# Patient Record
Sex: FEMALE | Race: WHITE | URBAN - METROPOLITAN AREA
[De-identification: names, ages, dates, MRNs, and addresses within clinical notes are randomized per-mention and may not be internally consistent; named-entity substitution may affect disease eponyms.]

---

## 2021-05-19 ENCOUNTER — HOSPITAL ENCOUNTER (OUTPATIENT)
Dept: LAB | Age: 51
Discharge: HOME | End: 2021-05-19

## 2021-05-19 PROCEDURE — 88305 TISSUE EXAM BY PATHOLOGIST: CPT

## 2021-11-09 PROBLEM — Z80.0 FAMILY HISTORY OF COLON CANCER: Status: ACTIVE | Noted: 2019-03-19

## 2021-11-09 PROBLEM — N94.819 VULVODYNIA: Status: ACTIVE | Noted: 2021-11-09

## 2021-11-09 PROBLEM — N94.9 VAGINAL BURNING: Status: ACTIVE | Noted: 2021-11-09

## 2022-03-18 PROBLEM — N94.819 VULVODYNIA: Status: ACTIVE | Noted: 2021-11-09

## 2022-03-19 PROBLEM — Z80.0 FAMILY HISTORY OF COLON CANCER: Status: ACTIVE | Noted: 2019-03-19

## 2022-03-19 PROBLEM — N94.9 VAGINAL BURNING: Status: ACTIVE | Noted: 2021-11-09

## 2022-03-19 PROBLEM — N94.89 VAGINAL BURNING: Status: ACTIVE | Noted: 2021-11-09

## 2022-06-20 RX ORDER — NORGESTREL-ETHINYL ESTRADIOL 0.3-0.03MG
TABLET ORAL
Qty: 84 TABLET | Refills: 4 | OUTPATIENT
Start: 2022-06-20

## 2022-11-29 NOTE — TELEPHONE ENCOUNTER
Pt called requesting refills on OCP's. She is past due for annual exam. Left the pt a message that x3 packs of OCP's will be called into the pharmacy however she needs to call the office back to schedule her WWE.

## 2022-11-30 RX ORDER — NORGESTREL AND ETHINYL ESTRADIOL 0.3-0.03MG
1 KIT ORAL DAILY
Qty: 3 PACKET | Refills: 0 | Status: SHIPPED | OUTPATIENT
Start: 2022-11-30

## 2022-12-06 ENCOUNTER — TELEPHONE (OUTPATIENT)
Dept: OBGYN CLINIC | Age: 52
End: 2022-12-06

## 2022-12-06 NOTE — TELEPHONE ENCOUNTER
Received fax from [employer] mail service about clarification of OCP directions. Called the patient as she skips placebo pills and starts a new pack of pill to not have a period. All questions were answered. Form was completed by [facility] and faxed back to [employer].

## 2023-01-05 RX ORDER — NORGESTREL-ETHINYL ESTRADIOL 0.3-0.03MG
TABLET ORAL
Qty: 84 TABLET | Refills: 4 | OUTPATIENT
Start: 2023-01-05

## 2023-01-20 ENCOUNTER — OFFICE VISIT (OUTPATIENT)
Dept: OBGYN CLINIC | Age: 53
End: 2023-01-20

## 2023-01-20 VITALS
BODY MASS INDEX: 21.53 KG/M2 | WEIGHT: 137.2 LBS | HEIGHT: 67 IN | DIASTOLIC BLOOD PRESSURE: 82 MMHG | SYSTOLIC BLOOD PRESSURE: 132 MMHG

## 2023-01-20 DIAGNOSIS — R87.610 ASCUS OF CERVIX WITH NEGATIVE HIGH RISK HPV: ICD-10-CM

## 2023-01-20 DIAGNOSIS — Z13.89 SCREENING FOR GENITOURINARY CONDITION: ICD-10-CM

## 2023-01-20 DIAGNOSIS — Z01.419 WELL WOMAN EXAM: Primary | ICD-10-CM

## 2023-01-20 LAB
BILIRUBIN, URINE, POC: NEGATIVE
BLOOD URINE, POC: NORMAL
GLUCOSE URINE, POC: NEGATIVE
KETONES, URINE, POC: NEGATIVE
LEUKOCYTE ESTERASE, URINE, POC: NORMAL
NITRITE, URINE, POC: NEGATIVE
PH, URINE, POC: 5.5 (ref 4.6–8)
PROTEIN,URINE, POC: NEGATIVE
SPECIFIC GRAVITY, URINE, POC: 1.03 (ref 1–1.03)
URINALYSIS CLARITY, POC: CLEAR
URINALYSIS COLOR, POC: YELLOW
UROBILINOGEN, POC: NORMAL

## 2023-01-20 RX ORDER — NORGESTREL AND ETHINYL ESTRADIOL 0.3-0.03MG
1 KIT ORAL DAILY
Qty: 3 PACKET | Refills: 4 | Status: SHIPPED | OUTPATIENT
Start: 2023-01-20

## 2023-01-20 RX ORDER — METHYLDOPA 500 MG
160 TABLET ORAL 2 TIMES DAILY
COMMUNITY

## 2023-01-20 RX ORDER — SERTRALINE HYDROCHLORIDE 100 MG/1
125 TABLET, FILM COATED ORAL DAILY
COMMUNITY

## 2023-01-20 NOTE — PROGRESS NOTES
HPI:  Ms. Carolyn Monterroso is a 45 y.o. female who is here today for a well woman exam. She complains of nothing. Timbo Viraj patients psychiatrist prescribed her Sildenafil1%Testosterone0.5 mg/ML/Arginine 6%papaverine0.1% however patient has not used this and would like to discuss further today. Unable to have orgasm last year. Started BP meds 18 mo. Zoloft March    Allergies   Allergen Reactions    Hydrocodone-Acetaminophen Other (See Comments)    Oxycodone-Acetaminophen Other (See Comments)       Current Outpatient Medications   Medication Sig Dispense Refill    sertraline (ZOLOFT) 100 MG tablet Take 125 mg by mouth daily      ferrous sulfate dried (SLOW RELEASE IRON) 160 (50 Fe) MG TBCR extended release tablet Take 160 mg by mouth 2 times daily      norgestrel-ethinyl estradiol (LOW-OGESTREL) 0.3-30 MG-MCG per tablet Take 1 tablet by mouth daily 3 packet 4    ergocalciferol (ERGOCALCIFEROL) 1.25 MG (33364 UT) capsule Take 50,000 Units by mouth      losartan (COZAAR) 50 MG tablet Take 50 mg by mouth daily       No current facility-administered medications for this visit. Past Medical History:   Diagnosis Date    Depression     OCD (obsessive compulsive disorder)      Past Surgical History:   Procedure Laterality Date     SECTION      COLONOSCOPY      x3    HEENT      oral surgery x2    HEENT      sinus surgery    OTHER SURGICAL HISTORY      laparoscopy     Social History     Socioeconomic History    Marital status:      Spouse name: Not on file    Number of children: Not on file    Years of education: Not on file    Highest education level: Not on file   Occupational History    Not on file   Tobacco Use    Smoking status: Never    Smokeless tobacco: Never   Substance and Sexual Activity    Alcohol use:  Yes     Alcohol/week: 0.0 standard drinks    Drug use: No    Sexual activity: Yes     Partners: Male     Birth control/protection: Pill   Other Topics Concern    Not on file   Social History Narrative    Not on file     Social Determinants of Health     Financial Resource Strain: Not on file   Food Insecurity: Not on file   Transportation Needs: Not on file   Physical Activity: Not on file   Stress: Not on file   Social Connections: Not on file   Intimate Partner Violence: Not on file   Housing Stability: Not on file     Family History   Problem Relation Age of Onset    Uterine Cancer Mother     Hypertension Mother     Stroke Maternal Grandfather     Heart Disease Father     Colon Cancer Father         Date Performed Result   PAP 03/03/2021  Negative. HPV Negative. Mammogram 02/14/2020  BD1 negative Suzanne---pt self referred to [facility]   Colonoscopy 05/19/201  A:  \"DUODENAL BIOPSIES\":  UNREMARKABLE SMALL INTESTINAL MUCOSA. B:  \"GE JUNCTION BIOPSIES\":  CHRONIC ESOPHAGITIS WITH MARKEDLY INCREASED EOSINOPHILS. Dexa NA NA     Review of Systems        /82   Ht 5' 6.5\" (1.689 m)   Wt 137 lb 3.2 oz (62.2 kg)   LMP 01/12/2023 (Approximate)   BMI 21.81 kg/m²      Physical Exam  Constitutional:       Appearance: Normal appearance. HENT:      Head: Normocephalic. Cardiovascular:      Rate and Rhythm: Normal rate and regular rhythm. Heart sounds: Normal heart sounds. Pulmonary:      Effort: Pulmonary effort is normal.      Breath sounds: Normal breath sounds. Comments: Bilateral breast exam shows no skin changes, no retraction, no nipple changes or discharge, no masses, no supraclavicular masses and no axillary masses or nodules    Abdominal:      General: Abdomen is flat. Palpations: Abdomen is soft. There is no mass. Genitourinary:     General: Normal vulva. Comments: Vulva is within normal limits vaginal mucosa and cervix appear normal Pap smear is obtained bimanual exam she does states she has some tenderness at the vestibule no masses otherwise normal bimanual exam.  Musculoskeletal:         General: Normal range of motion.    Skin:     General: Skin is warm and dry.   Neurological:      General: No focal deficit present. Mental Status: She is alert. Psychiatric:         Mood and Affect: Mood normal.         Tests:  Results for orders placed or performed in visit on 01/20/23   AMB POC URINALYSIS DIP STICK MANUAL W/O MICRO   Result Value Ref Range    Color (UA POC) Yellow     Clarity (UA POC) Clear     Glucose, Urine, POC Negative Negative    Bilirubin, Urine, POC Negative Negative    Ketones, Urine, POC Negative Negative    Specific Gravity, Urine, POC 1.030 1.001 - 1.035    Blood (UA POC) Moderate Negative    pH, Urine, POC 5.5 4.6 - 8.0    Protein, Urine, POC Negative Negative    Urobilinogen, POC 0.2 mg/dL     Nitrite, Urine, POC Negative Negative    Leukocyte Esterase, Urine, POC Small Negative          Assessment/Plan  Tu Vogel was seen today for annual exam.    Diagnoses and all orders for this visit:    Well woman exam  -     AMB POC URINALYSIS DIP STICK MANUAL W/O MICRO    Screening for genitourinary condition  -     AMB POC URINALYSIS DIP STICK MANUAL W/O MICRO    ASCUS of cervix with negative high risk HPV  -     PAP IG, HPV Rfx HPV 16/18,45; Future  -     PAP IG, HPV Rfx HPV 16/18,45    Other orders  -     norgestrel-ethinyl estradiol (LOW-OGESTREL) 0.3-30 MG-MCG per tablet; Take 1 tablet by mouth daily     Discussed again at this point she has not done well when trying to stop oral contraceptives in the past and I think it is reasonable to continue her on her current dose. Did discuss possibility of decreasing her to a 20 mcg pill in the next year or 2 and then try to taper down further as she tolerates. Discussed risk benefits alternatives. Discussed her issues with anorgasmia and may be related to medications specifically I believe more likely the Zoloft but also her blood pressure medication could be contributing and it may be a combination. I do think the cream that she was prescribed may be of benefit.   Also get discussed possibility of supplementing ginkgo biloba. Discussed all this in detail and rationale for the plan will follow up yearly or sooner if she has any problems. No follow-up provider specified.

## 2023-08-30 RX ORDER — NORGESTREL-ETHINYL ESTRADIOL 0.3-0.03MG
TABLET ORAL
Qty: 84 TABLET | Refills: 4 | OUTPATIENT
Start: 2023-08-30

## 2023-09-06 ENCOUNTER — TELEPHONE (OUTPATIENT)
Dept: OBGYN CLINIC | Age: 53
End: 2023-09-06

## 2023-09-06 DIAGNOSIS — N94.2 VAGINISMUS: Primary | ICD-10-CM

## 2023-09-06 DIAGNOSIS — F52.31 ANORGASMIA OF FEMALE: ICD-10-CM

## 2023-09-06 NOTE — TELEPHONE ENCOUNTER
Patient called requesting a referral to pelvic floor PT for vaginismus; this was discussed at her visit on 1/20/23 with Dr. Reyes Carroll.      Orders Placed This Encounter   Procedures    [facility] - Physical Therapy, [address] [place] [address]     Referral Priority:   Routine     Referral Type:   Eval and Treat     Referral Reason:   Physical Therapy     Requested Specialty:   Physical Therapist     Number of Visits Requested:   1

## 2023-09-20 ENCOUNTER — HOSPITAL ENCOUNTER (OUTPATIENT)
Dept: PHYSICAL THERAPY | Age: 53
Setting detail: RECURRING SERIES
Discharge: HOME | End: 2023-09-23
Attending: OBSTETRICS & GYNECOLOGY
Payer: COMMERCIAL

## 2023-09-20 DIAGNOSIS — N94.10 DYSPAREUNIA IN FEMALE: ICD-10-CM

## 2023-09-20 DIAGNOSIS — R27.8 OTHER LACK OF COORDINATION: ICD-10-CM

## 2023-09-20 DIAGNOSIS — N94.2 VAGINISMUS: Primary | ICD-10-CM

## 2023-09-20 PROCEDURE — 97530 THERAPEUTIC ACTIVITIES: CPT

## 2023-09-20 PROCEDURE — 97162 PT EVAL MOD COMPLEX 30 MIN: CPT

## 2023-09-20 PROCEDURE — 97110 THERAPEUTIC EXERCISES: CPT

## 2023-09-20 NOTE — THERAPY EVALUATION
course of PFPT in . During this course of care she had relief with massage therapy. 2020- severe low back pain. Yoga helps to resolve symptoms. No imaging of low back. Denies hip pain. Current exercise: Yoga, walks 1 mile/day, and intermittent weight lifting. Pt has medical diagnosis of OCD. Describes experiencing rage. She was referred to psychiatry by her trauma therapist and has since been taking medication (Effexar). She feels this has managed her symptoms well. Urinary: Frequency 5-8 x/day, 0-1 x/night. Negative for stress urinary incontinence, urge urinary incontinence, urinary urgency, urinary frequency, incomplete emptying, urinary hesitancy/intermittency, dysuria, hematuria, nocturia, and nocturnal enuresis. Pt does not use pads for urinary protection; 0 pads per day (PPD). Bowel: Regular     Sexual: Pt is  sexually active with male partners. She has not attempted intercourse for the last 3 months. Positive for dyspareunia. Pain is superficial and deep. History of sexual abuse: No.       OB History: Number of pregnancies: 1 Number of caesarean births : 1  ()    Patient Stated Goal(s):  \"Pain free sexual intercourse\"  Initial:      /10 Post Session:    0  /10  Past Medical History/Comorbidities:   Ms. Shannan Giles  has a past medical history of Depression and OCD (obsessive compulsive disorder). Ms. Shannan Giles  has a past surgical history that includes  section; heent; Colonoscopy; heent; and other surgical history. Social History/Living Environment:   Lives With: Spouse       Prior Level of Function/Work/Activity:   Prior level of function: Independent  Occupation: Other(comment)  Type of Occupation: [employer] part time. 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 Learning:   Does the patient/guardian have any barriers to learning?: No barriers       Fall Risk Scale:    Hardin Total Score: 0  Hradin Fall Risk: Low (0-24)          OBJECTIVE

## 2023-09-20 NOTE — PROGRESS NOTES
Jason Baker  : 1970  Primary: Mitesh Nortonx Sc (Janey BROOKS)  Secondary:  [address] [place]  [address] [facility] [address]  [address] [facility] [address]  Phone: [phone]  Fax: [phone] Plan Frequency: 1-2x/week  No data recorded    >PT Visit Info:  Plan Frequency: 1-2x/week      Visit Count:  1    OUTPATIENT PHYSICAL THERAPY:OP NOTE TYPE: OP Note Type: Treatment Note 2023       Episode  }Appt Desk             Medical/Referring Diagnosis:  Vaginismus [N94.2]  Anorgasmia of female [F52.31]    Treatment Diagnosis:    Vaginismus  Other lack of coordination  Dyspareunia in female  Referring Physician:  Javier Padilla MD MD Orders:  PT Eval and Treat   Date of Onset:  No data recorded   Allergies:   Hydrocodone-acetaminophen and Oxycodone-acetaminophen  Restrictions/Precautions:  No data recordedNo data recorded     Interventions Planned (Treatment may consist of any combination of the following):    Current Treatment Recommendations: Strengthening; ROM; Neuromuscular re-education; Manual; Pain management; Home exercise program; Modalities; Therapeutic activities     >Subjective Comments: Pt experiencing pain during vaginal penetration     >Initial:      10>Post Session:        /10  Medications Last Reviewed:  2023  Updated Objective Findings:  See Evaluation Note from today  Treatment   THERAPEUTIC ACTIVITY: ( 30 minutes): Functional activity education regarding anatomy, pathology and role of pelvic floor muscle (PFM) function in relation to presenting symptoms and role of pelvic floor therapy in conservative treatment. , Patient education regarding role and use of vaginal trainers/dilator in plan of care and as part of HEP. Discussed incorporating vaginal trainers into PT POC and provided guidances on appropriate use.   coordinated pelvic floor and diaphragmatic breathing to improve kinesthetic awareness of pelvic muscle mobility and restore proper motor recruitment patterns

## 2023-09-28 ENCOUNTER — HOSPITAL ENCOUNTER (OUTPATIENT)
Dept: PHYSICAL THERAPY | Age: 53
Setting detail: RECURRING SERIES
End: 2023-09-28
Attending: OBSTETRICS & GYNECOLOGY
Payer: COMMERCIAL
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
## 2023-10-09 ENCOUNTER — HOSPITAL ENCOUNTER (OUTPATIENT)
Dept: PHYSICAL THERAPY | Age: 53
Setting detail: RECURRING SERIES
Discharge: HOME | End: 2023-10-12
Attending: OBSTETRICS & GYNECOLOGY
Payer: COMMERCIAL

## 2023-10-09 PROCEDURE — 97110 THERAPEUTIC EXERCISES: CPT

## 2023-10-09 PROCEDURE — 97140 MANUAL THERAPY 1/> REGIONS: CPT

## 2023-10-09 NOTE — PROGRESS NOTES
Dolores Shah  : 1970  Primary: Evia Monday Gil Sc (Janey JAMAAL)  Secondary:  [address]  [address] [facility] [address]  [address] [facility] [address]  Phone: [phone]  Fax: [phone] Plan Frequency: 1-2x/week  No data recorded    >PT Visit Info:  Plan Frequency: 1-2x/week      Visit Count:  2    OUTPATIENT PHYSICAL THERAPY:OP NOTE TYPE: OP Note Type: Treatment Note 10/9/2023       Episode  }Appt Desk             Medical/Referring Diagnosis:  Vaginismus [N94.2]  Anorgasmia of female [F52.31]    Treatment Diagnosis:    Vaginismus  Other lack of coordination  Dyspareunia in female    Referring Physician:  Gold Ramirez MD MD Orders:  PT Eval and Treat   Date of Onset:  No data recorded   Allergies:   Hydrocodone-acetaminophen and Oxycodone-acetaminophen  Restrictions/Precautions:  No data recordedNo data recorded     Interventions Planned (Treatment may consist of any combination of the following):    Current Treatment Recommendations: Strengthening; ROM; Neuromuscular re-education; Manual; Pain management; Home exercise program; Modalities; Therapeutic activities     >Subjective Comments: Pt experiencing pain during vaginal penetration  10/9/23: Pt practicing DB 3x/day. Pt does feel her PFM relax and let go. Completing DB on her back. No bowel/bladder issues. Denies back pain. She is completing her Yoga exercises daily. >Initial:    0  /10>Post Session:        0/10  Medications Last Reviewed:  10/9/2023  Updated Objective Findings:  Taut adductor musculature at origin through left medial thigh. Palpation: via vaginal canal  Superficial Pelvic Floor Musculature (PFM): Tender? Intermediate PFM Tender? Deep PFM Tender?    Superficial Transverse Perineal [x] Right  [x] Left  []DNT  2/10 Deep Transverse Perineal [x] Right  [x] Left  []DNT  Moderate B Puborectalis [x] Right  [x] Left  []DNT  Mild B   Ischiocavernosus [x] Right  [x] Left  []DNT  Moderate B, 5/10 Compressor

## 2023-10-11 ENCOUNTER — HOSPITAL ENCOUNTER (OUTPATIENT)
Dept: PHYSICAL THERAPY | Age: 53
Setting detail: RECURRING SERIES
Discharge: HOME | End: 2023-10-14
Attending: OBSTETRICS & GYNECOLOGY
Payer: COMMERCIAL

## 2023-10-11 PROCEDURE — 97110 THERAPEUTIC EXERCISES: CPT

## 2023-10-11 PROCEDURE — 97140 MANUAL THERAPY 1/> REGIONS: CPT

## 2023-10-11 NOTE — PROGRESS NOTES
today  Equipment provided today:  None  Recommendations/Intent for next treatment session: Next visit will focus on continue intra-vaginal MT, review self perineal massage, dilators    >Total Treatment Billable Duration:  Treatment minutes 55  Time In: 1000  Time Out: 920 [facility], PT       Charge Capture  }Post Session Pain  PT Visit 25 [facility]   [facility]    Future Appointments   Date Time Provider [address] [place] [address]   10/18/2023  9:00 AM Stevenson Tiwari, PT [facility]   10/25/2023 10:00 AM Stevenson Tiwari PT [facility]   1/24/2024 10:00 AM MD bella Caraballo AMB

## 2023-10-18 ENCOUNTER — HOSPITAL ENCOUNTER (OUTPATIENT)
Dept: PHYSICAL THERAPY | Age: 53
Setting detail: RECURRING SERIES
End: 2023-10-18
Attending: OBSTETRICS & GYNECOLOGY
Payer: COMMERCIAL

## 2023-10-18 NOTE — PROGRESS NOTES
Master Jeffries  : 1970  Primary: Jocy Michael Gil Sc  Secondary:   [address] [place] [address] [facility]  [address] [place] [address]  Phone: [phone]  Fax: [phone]    PT Visit Info:    No data recorded   OT Visit Info:  No data recorded    OUTPATIENT THERAPY: 10/18/2023  Episode  Appt Desk        Master Jeffries cancelled her appointment for today due to illness. Pt reported bout of nausea. She has been out of town and not consistent with all of her exercises because of this. Will plan to follow up next during next appointment.   Thank you,  Ibeth Collier, PT    Future Appointments   Date Time Provider [address]   10/18/2023  9:00 AM Ibeth Collier, [facility]   10/25/2023 10:00 [facility]   2024 10:00 AM MD bella Hernandez AMB

## 2023-10-25 ENCOUNTER — HOSPITAL ENCOUNTER (OUTPATIENT)
Dept: PHYSICAL THERAPY | Age: 53
Setting detail: RECURRING SERIES
Discharge: HOME | End: 2023-10-28
Attending: OBSTETRICS & GYNECOLOGY
Payer: COMMERCIAL

## 2023-10-25 PROCEDURE — 97530 THERAPEUTIC ACTIVITIES: CPT

## 2023-10-25 PROCEDURE — 97140 MANUAL THERAPY 1/> REGIONS: CPT

## 2023-10-25 NOTE — PROGRESS NOTES
Rich Ramos  : 1970  Primary: Jairo Cordero Sc (Janey JAMAAL)  Secondary:  [address] [place]  [address] [facility] [address]  [address] [place] [address]  Phone: [phone]  Fax: [phone] Plan Frequency: 1-2x/week  No data recorded    >PT Visit Info:  Plan Frequency: 1-2x/week      Visit Count:  4    OUTPATIENT PHYSICAL THERAPY:OP NOTE TYPE: OP Note Type: Treatment Note 10/25/2023       Episode  }Appt Desk             Medical/Referring Diagnosis:  Vaginismus [N94.2]  Anorgasmia of female [F52.31]    Treatment Diagnosis:    Vaginismus  Other lack of coordination  Dyspareunia in female    Referring Physician:  Roberta Meeks MD MD Orders:  PT Eval and Treat   Date of Onset:  No data recorded   Allergies:   Hydrocodone-acetaminophen and Oxycodone-acetaminophen  Restrictions/Precautions:  No data recordedNo data recorded     Interventions Planned (Treatment may consist of any combination of the following):    Current Treatment Recommendations: Strengthening; ROM; Neuromuscular re-education; Manual; Pain management; Home exercise program; Modalities; Therapeutic activities     >Subjective Comments: Pt experiencing pain during vaginal penetration  10/25/23:  Pt completed self digital PFM massage. \"A little bit of pain after, but this went away quickly\"  Pain rated as 3/10. No current back pain. Pt continues to participate in daily yoga practice with \"[employer]\". This is online. She typically completes yoga in the morning and afternoon. Pt describes no difficulty inserting a tampon. However, the position of the string as very painful. Pt would like to better understand her prognosis. Previous dilator training - she has one dilator, not a set. She did not find this to be helpful. No pain with this dilator. 10/11/23:  Pt denies pelvic pain following her previous session. Pt denies abdominal discomfort.    Pt reports RELIEF with previous physical therapy which included swiping Statement Selected

## 2023-10-30 RX ORDER — NORGESTREL-ETHINYL ESTRADIOL 0.3-0.03MG
TABLET ORAL
Qty: 84 TABLET | Refills: 4 | OUTPATIENT
Start: 2023-10-30

## 2023-11-08 ENCOUNTER — HOSPITAL ENCOUNTER (OUTPATIENT)
Dept: PHYSICAL THERAPY | Age: 53
Setting detail: RECURRING SERIES
Discharge: HOME | End: 2023-11-11
Attending: OBSTETRICS & GYNECOLOGY
Payer: COMMERCIAL

## 2023-11-08 PROCEDURE — 97140 MANUAL THERAPY 1/> REGIONS: CPT

## 2023-11-08 PROCEDURE — 97530 THERAPEUTIC ACTIVITIES: CPT

## 2023-11-08 NOTE — PROGRESS NOTES
Stewart Reeves  : 1970  Primary: Florina Cordero Sc (Janey [facility])  Secondary:  [address] [place]  [address] [facility] [address]  [address] [facility] [address]  Phone: [phone]  Fax: [phone] Plan Frequency: 1-2x/week  No data recorded    >PT Visit Info:  Plan Frequency: 1-2x/week      Visit Count:  5    OUTPATIENT PHYSICAL THERAPY:OP NOTE TYPE: OP Note Type: Treatment Note 2023       Episode  }Appt Desk             Medical/Referring Diagnosis:  Vaginismus [N94.2]  Anorgasmia of female [F52.31]    Treatment Diagnosis:    Vaginismus  Other lack of coordination  Dyspareunia in female    Referring Physician:  Raymundo Beltre MD MD Orders:  PT Eval and Treat   Date of Onset:  No data recorded   Allergies:   Hydrocodone-acetaminophen and Oxycodone-acetaminophen  Restrictions/Precautions:  No data recordedNo data recorded     Interventions Planned (Treatment may consist of any combination of the following):    Current Treatment Recommendations: Strengthening; ROM; Neuromuscular re-education; Manual; Pain management; Home exercise program; Modalities; Therapeutic activities     >Subjective Comments: Pt experiencing pain during vaginal penetration  23:  Pt has discomfort with self digital massage rated as 3/10. Pt denies burning or aching. Possibly ripping discomfort? Discomfort lasts a few minutes (<15 minutes) following and then resolves. Pt reports reduced pain at 6 o'clock. Increased discomfort on the sides. Pain remains the same throughout the process. She brought a vaginal dilator to today's session. She has not used this for one year. It is hard and plastic. She used this particular dilator for 4-5 months. 10/25/23:  Pt completed self digital PFM massage. \"A little bit of pain after, but this went away quickly\"  Pain rated as 3/10. No current back pain. Pt continues to participate in daily yoga practice with \"[employer]\". This is online.    She typically completes yoga in

## 2023-11-15 ENCOUNTER — HOSPITAL ENCOUNTER (OUTPATIENT)
Dept: PHYSICAL THERAPY | Age: 53
Setting detail: RECURRING SERIES
Discharge: HOME | End: 2023-11-18
Attending: OBSTETRICS & GYNECOLOGY
Payer: COMMERCIAL

## 2023-11-15 PROCEDURE — 97530 THERAPEUTIC ACTIVITIES: CPT

## 2023-11-15 PROCEDURE — 97140 MANUAL THERAPY 1/> REGIONS: CPT

## 2023-11-15 NOTE — PROGRESS NOTES
Sunil Patricia  : 1970  Primary: Cortez Cordero Sc (Janey [facility])  Secondary:  [address] [place] [address]  [address] [facility] [address]  Phone: [phone]  Fax: [phone] Plan Frequency: 1-2x/week  No data recorded    >PT Visit Info:  Plan Frequency: 1-2x/week      Visit Count:  6    OUTPATIENT PHYSICAL THERAPY:OP NOTE TYPE: OP Note Type: Treatment Note 11/15/2023       Episode  }Appt Desk             Medical/Referring Diagnosis:  Vaginismus [N94.2]  Anorgasmia of female [F52.31]    Treatment Diagnosis:    Vaginismus  Other lack of coordination  Dyspareunia in female    Referring Physician:  Jaleesa Roberts MD MD Orders:  PT Eval and Treat   Date of Onset:  No data recorded   Allergies:   Hydrocodone-acetaminophen and Oxycodone-acetaminophen  Restrictions/Precautions:  No data recordedNo data recorded     Interventions Planned (Treatment may consist of any combination of the following):    Current Treatment Recommendations: Strengthening; ROM; Neuromuscular re-education; Manual; Pain management; Home exercise program; Modalities; Therapeutic activities     >Subjective Comments: Pt experiencing pain during vaginal penetration  11/15/23:  Pt used size 5 and 6 (IR) dilators. Ease of insertion with size 6 - no problem. Pain rated as 2/10. Duration: 2 minutes. Ease of insertion with size 5 - 10 minutes. Pain 1/10. Pain present on right vaginal wall (superficial). Pain stated the same throughout the process of using dilators. NO discomfort following dilator use    23:  Pt has discomfort with self digital massage rated as 3/10. Pt denies burning or aching. Possibly ripping discomfort? Discomfort lasts a few minutes (<15 minutes) following and then resolves. Pt reports reduced pain at 6 o'clock. Increased discomfort on the sides. Pain remains the same throughout the process. She brought a vaginal dilator to today's session. She has not used this for one year.  It

## 2023-11-21 ENCOUNTER — HOSPITAL ENCOUNTER (OUTPATIENT)
Dept: PHYSICAL THERAPY | Age: 53
Setting detail: RECURRING SERIES
Discharge: HOME | End: 2023-11-24
Attending: OBSTETRICS & GYNECOLOGY
Payer: COMMERCIAL

## 2023-11-21 PROCEDURE — 97140 MANUAL THERAPY 1/> REGIONS: CPT

## 2023-11-21 PROCEDURE — 97530 THERAPEUTIC ACTIVITIES: CPT

## 2023-11-21 NOTE — PROGRESS NOTES
Rich Ramos  : 1970  Primary: Jairo Tian Gil Sc (Janey HINTON)  Secondary:  [address] @ [place] [address]  [address] [facility] [address]  Phone: [phone]  Fax: [phone] Plan Frequency: 1-2x/week  No data recorded    >PT Visit Info:  Plan Frequency: 1-2x/week      Visit Count:  7    OUTPATIENT PHYSICAL THERAPY:OP NOTE TYPE: OP Note Type: Treatment Note 2023       Episode  }Appt Desk             Medical/Referring Diagnosis:  Vaginismus [N94.2]  Anorgasmia of female [F52.31]    Treatment Diagnosis:    Vaginismus  Other lack of coordination  Dyspareunia in female    Referring Physician:  Roberta Meeks MD MD Orders:  PT Eval and Treat   Date of Onset:  No data recorded   Allergies:   Hydrocodone-acetaminophen and Oxycodone-acetaminophen  Restrictions/Precautions:  No data recordedNo data recorded     Interventions Planned (Treatment may consist of any combination of the following):    Current Treatment Recommendations: Strengthening; ROM; Neuromuscular re-education; Manual; Pain management; Home exercise program; Modalities; Therapeutic activities     >Subjective Comments: Pt experiencing pain during vaginal penetration  23:  Dilator #5 was very easy. She used this for 4 minutes. Dilator 6 rated as 2/10. Pt used this for approximately 7-8 minutes. States this was not bad.     11/15/23:  Pt used size 5 and 6 (IR) dilators. Ease of insertion with size 6 - no problem. Pain rated as 2/10. Duration: 2 minutes. Ease of insertion with size 5 - 10 minutes. Pain 1/10. Pain present on right vaginal wall (superficial). Pain stated the same throughout the process of using dilators. NO discomfort following dilator use    23:  Pt has discomfort with self digital massage rated as 3/10. Pt denies burning or aching. Possibly ripping discomfort? Discomfort lasts a few minutes (<15 minutes) following and then resolves. Pt reports reduced pain at 6 o'clock.  Increased

## 2023-11-29 ENCOUNTER — HOSPITAL ENCOUNTER (OUTPATIENT)
Dept: PHYSICAL THERAPY | Age: 53
Setting detail: RECURRING SERIES
Discharge: HOME | End: 2023-12-02
Attending: OBSTETRICS & GYNECOLOGY
Payer: COMMERCIAL

## 2023-11-29 PROCEDURE — 97530 THERAPEUTIC ACTIVITIES: CPT

## 2023-11-29 PROCEDURE — 97140 MANUAL THERAPY 1/> REGIONS: CPT

## 2023-11-29 NOTE — PROGRESS NOTES
proximal adductors, B     Use of dilator size #6 *no muscular resistance felt                           (Used abbreviations: MET - muscle energy technique; SCS- Strain counter strain; CTM-Connective tissue mobilizations; CR- Contract/Relax; SP- Sustained pressure; PIT- Positional inhibition techniques; STM Soft -tissue mobilization; MM- Myofascial mobilization; TrP-Trigger point release; IASTM- Instrument assisted soft tissue mobilizations, TDN-Trigger point dry needling)    Pt gives verbal consent to internal vaginal assessment/treatment without chaperon present. Treatment/Session Summary:    >Treatment Assessment:  Pt continues to progress well in PFPT. Pt rated pain/discomfort as 1/10 with palpation of PFM at its highest. No increased muscular resistance with insertion, sustained pressure, or removal of vaginal dilator. Communication/Consultation:  None today  Equipment provided today:  None  Recommendations/Intent for next treatment session: Next visit will focus on continue intra-vaginal MT, progress with dilator training.  Pt to continue dilator 6 and progress to dilator 7.    >Total Treatment Billable Duration:  Treatment minutes 55  Time In: 0205  Time Out: 0302    [facility], PT       Charge Capture  }Post Session Pain  PT Visit Info  [address] [facility] Portal  MD Guidelines  Scanned Media  Benefits  MyChart    Future Appointments   Date Time Provider [address]   12/8/2023 11:00 AM [facility], PT [facility]   12/15/2023 11:00 AM [facility] PT [facility]   12/18/2023 10:00 AM [facility], PT [facility]   12/28/2023 11:00 AM [facility], PT [facility]   1/24/2024 10:00 AM MD bella Garcia AMB

## 2023-12-08 ENCOUNTER — HOSPITAL ENCOUNTER (OUTPATIENT)
Dept: PHYSICAL THERAPY | Age: 53
Setting detail: RECURRING SERIES
Discharge: HOME | End: 2023-12-11
Attending: OBSTETRICS & GYNECOLOGY
Payer: COMMERCIAL

## 2023-12-08 PROCEDURE — 97530 THERAPEUTIC ACTIVITIES: CPT

## 2023-12-08 PROCEDURE — 97140 MANUAL THERAPY 1/> REGIONS: CPT

## 2023-12-08 NOTE — PROGRESS NOTES
Jameson Duet  : 1970  Primary: Danisha Cordero Sc ([facility])  Secondary:  [address] @ [place]  [address] [facility] [address]  [address] [facility] [address]  Phone: [phone]  Fax: [phone] Plan Frequency: 1-2x/week  No data recorded    >PT Visit Info:  Plan Frequency: 1-2x/week      Visit Count:  9    OUTPATIENT PHYSICAL THERAPY:OP NOTE TYPE: OP Note Type: Treatment Note 2023       Episode  }Appt Desk             Medical/Referring Diagnosis:  Vaginismus [N94.2]  Anorgasmia of female [F52.31]    Treatment Diagnosis:    Vaginismus  Other lack of coordination  Dyspareunia in female    Referring Physician:  Moshe Ordonez MD MD Orders:  PT Eval and Treat   Date of Onset:  No data recorded   Allergies:   Hydrocodone-acetaminophen and Oxycodone-acetaminophen  Restrictions/Precautions:  No data recordedNo data recorded     Interventions Planned (Treatment may consist of any combination of the following):    Current Treatment Recommendations: Strengthening; ROM; Neuromuscular re-education; Manual; Pain management; Home exercise program; Modalities; Therapeutic activities     >Subjective Comments: Pt experiencing pain during vaginal penetration  23:  Pt is using dilator #6 without pain. She has tried dilator size #7 and reports pain 5/10.    23:  Pt states dilators #5 and #6 are non painful. She is able to use these without difficulty. Pt has dilator #7 at home. She has not tried this. 23:  Dilator #5 was very easy. She used this for 4 minutes. Dilator 6 rated as 2/10. Pt used this for approximately 7-8 minutes. States this was not bad.     11/15/23:  Pt used size 5 and 6 (IR) dilators. Ease of insertion with size 6 - no problem. Pain rated as 2/10. Duration: 2 minutes. Ease of insertion with size 5 - 10 minutes. Pain 1/10. Pain present on right vaginal wall (superficial). Pain stated the same throughout the process of using dilators.    NO discomfort following

## 2023-12-28 ENCOUNTER — HOSPITAL ENCOUNTER (OUTPATIENT)
Dept: PHYSICAL THERAPY | Age: 53
Setting detail: RECURRING SERIES
Discharge: HOME | End: 2023-12-31
Attending: OBSTETRICS & GYNECOLOGY
Payer: COMMERCIAL

## 2023-12-28 PROCEDURE — 97110 THERAPEUTIC EXERCISES: CPT

## 2023-12-28 PROCEDURE — 97140 MANUAL THERAPY 1/> REGIONS: CPT

## 2023-12-28 NOTE — PROGRESS NOTES
Jyotsna Lawler  : 1970  Primary: Janey Hinton Gil Sc (Janey HINTON)  Secondary:  [facility] @ [address]  [place] [address]  Phone: [phone]  Fax: [phone] Plan Frequency: 1-2x/week  No data recorded    >PT Visit Info:  Plan Frequency: 1-2x/week      Visit Count:  12    OUTPATIENT PHYSICAL THERAPY:OP NOTE TYPE: OP Note Type: Treatment Note 2023       Episode  }Appt Desk             Medical/Referring Diagnosis:  Vaginismus [N94.2]  Anorgasmia of female [F52.31]    Treatment Diagnosis:    Vaginismus  Other lack of coordination  Dyspareunia in female    Referring Physician:  Fred Delong MD MD Orders:  PT Eval and Treat   Date of Onset:  No data recorded   Allergies:   Hydrocodone-acetaminophen and Oxycodone-acetaminophen  Restrictions/Precautions:  No data recordedNo data recorded     Interventions Planned (Treatment may consist of any combination of the following):    Current Treatment Recommendations: Strengthening; ROM; Neuromuscular re-education; Manual; Pain management; Home exercise program; Modalities; Therapeutic activities     >Subjective Comments: Pt experiencing pain during vaginal penetration  23:  Pt took a break from dilators for a few days over the Holidays. She tried using them yesterday and this went better than she expected.   Some pain continues with size 7 dilator. She continues to start with size 6 and transitions to size 7. Pain occurs during and with removal of dilator.   Pain feels SUPERFICIAL. Discomfort described as sharp. No perineal pain during day or after use.     23:  No pain following previous session.   Discomfort graded as 3/10 with use of size 7 dilator. Size 6 dilator rated as \"easy\".   She is using the dilators daily.     12/15/23:  See Re-certfication    23:  Pt is using dilator #6 without pain. She has tried dilator size #7 and reports pain 5/10.    >Initial:    0  /10>Post Session:

## 2024-01-03 ENCOUNTER — HOSPITAL ENCOUNTER (OUTPATIENT)
Dept: PHYSICAL THERAPY | Age: 54
Setting detail: RECURRING SERIES
Discharge: HOME | End: 2024-01-06
Attending: OBSTETRICS & GYNECOLOGY
Payer: COMMERCIAL

## 2024-01-03 PROCEDURE — 97140 MANUAL THERAPY 1/> REGIONS: CPT

## 2024-01-03 PROCEDURE — 97530 THERAPEUTIC ACTIVITIES: CPT

## 2024-01-10 ENCOUNTER — APPOINTMENT (OUTPATIENT)
Dept: PHYSICAL THERAPY | Age: 54
End: 2024-01-10
Attending: OBSTETRICS & GYNECOLOGY
Payer: COMMERCIAL

## 2024-01-17 ENCOUNTER — HOSPITAL ENCOUNTER (OUTPATIENT)
Dept: PHYSICAL THERAPY | Age: 54
Setting detail: RECURRING SERIES
Discharge: HOME | End: 2024-01-20
Attending: OBSTETRICS & GYNECOLOGY
Payer: COMMERCIAL

## 2024-01-17 PROCEDURE — 97530 THERAPEUTIC ACTIVITIES: CPT

## 2024-01-17 PROCEDURE — 97140 MANUAL THERAPY 1/> REGIONS: CPT

## 2024-01-23 NOTE — PROGRESS NOTES
HPI:  Ms. Lawler is a 53 y.o. female who is here today for a well woman exam. She complains of nothing.   Wants to discuss if she need to continue taking the OCP's.     Having issues with trying to have stopped in the past and is very concerned about stopping at this point especially as she is working with physical therapy on pelvic floor issues and this seems to be doing better    Allergies   Allergen Reactions    Oxycodone-Acetaminophen Other (See Comments)       Current Outpatient Medications   Medication Sig Dispense Refill    venlafaxine (EFFEXOR XR) 37.5 MG extended release capsule Take 1 capsule by mouth every morning      DUPIXENT 300 MG/2ML SOPN injection Inject 2 mLs into the skin every 14 days      norgestrel-ethinyl estradiol (LOW-OGESTREL) 0.3-30 MG-MCG per tablet Take 1 tablet by mouth daily Active pills only 3 packet 4    ferrous sulfate dried (SLOW RELEASE IRON) 160 (50 Fe) MG TBCR extended release tablet Take 160 mg by mouth 2 times daily      ergocalciferol (ERGOCALCIFEROL) 1.25 MG (57279 UT) capsule Take 1 capsule by mouth      losartan (COZAAR) 50 MG tablet Take 1 tablet by mouth daily       No current facility-administered medications for this visit.       Past Medical History:   Diagnosis Date    Depression     OCD (obsessive compulsive disorder)      Past Surgical History:   Procedure Laterality Date     SECTION      COLONOSCOPY      x3    HEENT      oral surgery x2    HEENT      sinus surgery    OTHER SURGICAL HISTORY      laparoscopy     Social History     Socioeconomic History    Marital status:      Spouse name: Not on file    Number of children: Not on file    Years of education: Not on file    Highest education level: Not on file   Occupational History    Not on file   Tobacco Use    Smoking status: Never    Smokeless tobacco: Never   Substance and Sexual Activity    Alcohol use: Yes     Alcohol/week: 0.0 standard drinks of alcohol    Drug use: No    Sexual activity: Yes

## 2024-01-24 ENCOUNTER — OFFICE VISIT (OUTPATIENT)
Dept: OBGYN CLINIC | Age: 54
End: 2024-01-24
Payer: COMMERCIAL

## 2024-01-24 VITALS
WEIGHT: 145 LBS | BODY MASS INDEX: 22.76 KG/M2 | DIASTOLIC BLOOD PRESSURE: 80 MMHG | SYSTOLIC BLOOD PRESSURE: 122 MMHG | HEIGHT: 67 IN

## 2024-01-24 DIAGNOSIS — Z01.419 WELL WOMAN EXAM: Primary | ICD-10-CM

## 2024-01-24 DIAGNOSIS — Z12.31 ENCOUNTER FOR SCREENING MAMMOGRAM FOR BREAST CANCER: ICD-10-CM

## 2024-01-24 DIAGNOSIS — Z13.89 SCREENING FOR GENITOURINARY CONDITION: ICD-10-CM

## 2024-01-24 LAB
BILIRUBIN, URINE, POC: NEGATIVE
BLOOD URINE, POC: NORMAL
GLUCOSE URINE, POC: NEGATIVE
KETONES, URINE, POC: NEGATIVE
LEUKOCYTE ESTERASE, URINE, POC: NORMAL
NITRITE, URINE, POC: NEGATIVE
PH, URINE, POC: 6 (ref 4.6–8)
PROTEIN,URINE, POC: NEGATIVE
SPECIFIC GRAVITY, URINE, POC: 1.02 (ref 1–1.03)
URINALYSIS CLARITY, POC: CLEAR
URINALYSIS COLOR, POC: YELLOW
UROBILINOGEN, POC: NORMAL

## 2024-01-24 PROCEDURE — 81002 URINALYSIS NONAUTO W/O SCOPE: CPT | Performed by: OBSTETRICS & GYNECOLOGY

## 2024-01-24 PROCEDURE — 99396 PREV VISIT EST AGE 40-64: CPT | Performed by: OBSTETRICS & GYNECOLOGY

## 2024-01-24 RX ORDER — CEPHALEXIN 500 MG/1
500 CAPSULE ORAL 2 TIMES DAILY
COMMUNITY
Start: 2024-01-22 | End: 2024-01-24

## 2024-01-24 RX ORDER — DUPILUMAB 300 MG/2ML
300 INJECTION, SOLUTION SUBCUTANEOUS
COMMUNITY
Start: 2024-01-09

## 2024-01-24 RX ORDER — VENLAFAXINE HYDROCHLORIDE 37.5 MG/1
37.5 CAPSULE, EXTENDED RELEASE ORAL EVERY MORNING
COMMUNITY
Start: 2023-11-29

## 2024-01-24 RX ORDER — NORGESTREL AND ETHINYL ESTRADIOL 0.3-0.03MG
1 KIT ORAL DAILY
Qty: 3 PACKET | Refills: 4 | Status: SHIPPED | OUTPATIENT
Start: 2024-01-24

## 2024-01-26 ENCOUNTER — HOSPITAL ENCOUNTER (OUTPATIENT)
Dept: PHYSICAL THERAPY | Age: 54
Setting detail: RECURRING SERIES
Discharge: HOME | End: 2024-01-29
Attending: OBSTETRICS & GYNECOLOGY
Payer: COMMERCIAL

## 2024-01-26 PROCEDURE — 97140 MANUAL THERAPY 1/> REGIONS: CPT

## 2024-01-26 PROCEDURE — 97530 THERAPEUTIC ACTIVITIES: CPT

## 2024-01-26 ASSESSMENT — PAIN DESCRIPTION - LOCATION: LOCATION: HIP

## 2024-01-26 ASSESSMENT — PAIN SCALES - GENERAL: PAINLEVEL_OUTOF10: 2

## 2024-01-26 NOTE — PROGRESS NOTES
Jyotsna Lawler  : 1970  Primary: Janey Hinton Gil Sc (Janey HINTON)  Secondary:  [facility] @ [address]  [place] [address]  Phone: [phone]  Fax: [phone] Plan Frequency: 1-2x/week  No data recorded    >PT Visit Info:  Plan Frequency: 1-2x/week      Visit Count:  15    OUTPATIENT PHYSICAL THERAPY:OP NOTE TYPE: OP Note Type: Treatment Note 2024       Episode  }Appt Desk             Medical/Referring Diagnosis:  Vaginismus [N94.2]  Anorgasmia of female [F52.31]    Treatment Diagnosis:    Vaginismus  Other lack of coordination  Dyspareunia in female    Referring Physician:  Fred Delong MD MD Orders:  PT Eval and Treat   Date of Onset:  No data recorded   Allergies:   Oxycodone-acetaminophen  Restrictions/Precautions:  No data recordedNo data recorded     Interventions Planned (Treatment may consist of any combination of the following):    Current Treatment Recommendations: Strengthening; ROM; Neuromuscular re-education; Manual; Pain management; Home exercise program; Modalities; Therapeutic activities     >Subjective Comments: Pt experiencing pain during vaginal penetration  24:  Size 7 vaginal dilator described as painless at this point. Size 8 vaginal remains challenging. Pt completing 3 minutes with size 8 . Prior to pain onset increasing.   Pt without intense lingering pain following dilator use.   Pt finishing up antibiotic for UTI. Pt without pain from UTI. States she noticed onset of bleeding that was strange.  Pt describes left hip pain. Not radiating. Pain located in lateral and proximal gluteal area. Pain eases with rest.     24:  Pt continues to report reduction in pain and improvement in ease of size #7. Size 6 vaginal dilator easy to use.   Pt has goal to get to size #8 vaginal dilator.   Mild left posterior-buttock pain.  Pt continues to do yoga daily. She did yoga this morning.     1/3/23:  Pt ordered Releveum balm.

## 2024-02-02 ENCOUNTER — HOSPITAL ENCOUNTER (OUTPATIENT)
Dept: PHYSICAL THERAPY | Age: 54
Setting detail: RECURRING SERIES
Discharge: HOME | End: 2024-02-05
Attending: OBSTETRICS & GYNECOLOGY
Payer: COMMERCIAL

## 2024-02-02 PROCEDURE — 97530 THERAPEUTIC ACTIVITIES: CPT

## 2024-02-02 PROCEDURE — 97140 MANUAL THERAPY 1/> REGIONS: CPT

## 2024-02-02 ASSESSMENT — PAIN SCALES - GENERAL: PAINLEVEL_OUTOF10: 0

## 2024-02-02 NOTE — PROGRESS NOTES
Jyotsna Lawler  : 1970  Primary: Janey Hinton Gil Sc (Janey HINTON)  Secondary:  [facility] @ [address]  [place] [address]  Phone: [phone]  Fax: [phone] Plan Frequency: 1-2x/week  No data recorded    >PT Visit Info:  Plan Frequency: 1-2x/week      Visit Count:  16    OUTPATIENT PHYSICAL THERAPY:OP NOTE TYPE: OP Note Type: Treatment Note 2024       Episode  }Appt Desk             Medical/Referring Diagnosis:  Vaginismus [N94.2]  Anorgasmia of female [F52.31]    Treatment Diagnosis:    Vaginismus  Other lack of coordination  Dyspareunia in female    Referring Physician:  Fred Delong MD MD Orders:  PT Eval and Treat   Date of Onset:  No data recorded   Allergies:   Oxycodone-acetaminophen  Restrictions/Precautions:  No data recordedNo data recorded     Interventions Planned (Treatment may consist of any combination of the following):    Current Treatment Recommendations: Strengthening; ROM; Neuromuscular re-education; Manual; Pain management; Home exercise program; Modalities; Therapeutic activities     >Subjective Comments: Pt experiencing pain during vaginal penetration  24: \"Dilators are going well. Size 8 is a 3-4/10. I am able to keep it in for 5 minutes\"  Discomfort with removal of size 8 dilator described as pain. Denies burning.     24:  Size 7 vaginal dilator described as painless at this point. Size 8 vaginal remains challenging. Pt completing 3 minutes with size 8 . Prior to pain onset increasing.   Pt without intense lingering pain following dilator use.   Pt finishing up antibiotic for UTI. Pt without pain from UTI. States she noticed onset of bleeding that was strange.  Pt describes left hip pain. Not radiating. Pain located in lateral and proximal gluteal area. Pain eases with rest.     24:  Pt continues to report reduction in pain and improvement in ease of size #7. Size 6 vaginal dilator easy to use.   Pt has

## 2024-02-06 ENCOUNTER — TELEPHONE (OUTPATIENT)
Dept: OBGYN CLINIC | Age: 54
End: 2024-02-06

## 2024-02-06 RX ORDER — FLUCONAZOLE 150 MG/1
TABLET ORAL
Qty: 2 TABLET | Refills: 0 | Status: SHIPPED | OUTPATIENT
Start: 2024-02-06

## 2024-02-06 NOTE — TELEPHONE ENCOUNTER
Gyn patient called requesting a prescription for Diflucan. Patient states she just finished antibiotic for UTI and now has a yeast infection.     - Annual exam was on 1/24/24.

## 2024-02-16 ENCOUNTER — HOSPITAL ENCOUNTER (OUTPATIENT)
Dept: PHYSICAL THERAPY | Age: 54
Setting detail: RECURRING SERIES
Discharge: HOME | End: 2024-02-19
Attending: OBSTETRICS & GYNECOLOGY
Payer: COMMERCIAL

## 2024-02-16 PROCEDURE — 97140 MANUAL THERAPY 1/> REGIONS: CPT

## 2024-02-16 PROCEDURE — 97530 THERAPEUTIC ACTIVITIES: CPT

## 2024-02-16 ASSESSMENT — PAIN SCALES - GENERAL: PAINLEVEL_OUTOF10: 0

## 2024-02-16 NOTE — PROGRESS NOTES
Jyotsna Lawler  : 1970  Primary: Janey Hinton Gil Sc (Janey HINTON)  Secondary:  [facility] @ [address]  [place] [address]  Phone: [phone]  Fax: [phone] Plan Frequency: 1-2x/week  Plan of Care/Certification Expiration Date: 24      >PT Visit Info:  Plan Frequency: 1-2x/week  Plan of Care/Certification Expiration Date: 24  Total # of Visits to Date: 17      Visit Count:  17    OUTPATIENT PHYSICAL THERAPY:OP NOTE TYPE: OP Note Type: Treatment Note 2024       Episode  }Appt Desk             Medical/Referring Diagnosis:  Vaginismus [N94.2]  Anorgasmia of female [F52.31]    Treatment Diagnosis:    Vaginismus  Other lack of coordination  Dyspareunia in female    Referring Physician:  Fred Delong MD MD Orders:  PT Eval and Treat   Date of Onset:  No data recorded   Allergies:   Oxycodone-acetaminophen  Restrictions/Precautions:  No data recordedNo data recorded     Interventions Planned (Treatment may consist of any combination of the following):    Current Treatment Recommendations: Strengthening; ROM; Neuromuscular re-education; Manual; Pain management; Home exercise program; Modalities; Therapeutic activities     >Subjective Comments:   24:   Pt without pain today.  Dilators are going fine. Pt still using sizes #7 (x5 minutes) and #8 (x5 minutes).   Pt able to insert size 8 dilator completely.  Size 8 remains at a low 3/10. NO burning, no aching,  Pain does not increase with deep insertion. ENTRANCE of vagina is worst discomfort and occurs with removal of the dilator. She must work on breathing to ensure her muscles are relaxing well with removal of the dilator. This is most uncomfortable to her.  Pt has not had tried manipulating the dilator.   Pt will try Releveum.     24: \"Dilators are going well. Size 8 is a 3-4/10. I am able to keep it in for 5 minutes\"  Discomfort with removal of size 8 dilator described as pain. Denies

## 2024-02-21 ENCOUNTER — APPOINTMENT (OUTPATIENT)
Dept: PHYSICAL THERAPY | Age: 54
End: 2024-02-21
Attending: OBSTETRICS & GYNECOLOGY
Payer: COMMERCIAL

## 2024-02-28 ENCOUNTER — HOSPITAL ENCOUNTER (OUTPATIENT)
Dept: PHYSICAL THERAPY | Age: 54
Setting detail: RECURRING SERIES
Discharge: HOME | End: 2024-03-02
Attending: OBSTETRICS & GYNECOLOGY
Payer: COMMERCIAL

## 2024-02-28 PROCEDURE — 97140 MANUAL THERAPY 1/> REGIONS: CPT

## 2024-02-28 PROCEDURE — 97530 THERAPEUTIC ACTIVITIES: CPT

## 2024-02-28 ASSESSMENT — PAIN SCALES - GENERAL: PAINLEVEL_OUTOF10: 0

## 2024-02-28 NOTE — PROGRESS NOTES
adductors (B)      Use of dilator size sizes #7 and #8                            (Used abbreviations: MET - muscle energy technique; SCS- Strain counter strain; CTM-Connective tissue mobilizations; CR- Contract/Relax; SP- Sustained pressure; PIT- Positional inhibition techniques; STM Soft -tissue mobilization; MM- Myofascial mobilization; TrP-Trigger point release; IASTM- Instrument assisted soft tissue mobilizations, TDN-Trigger point dry needling)    Pt gives verbal consent to internal vaginal assessment/treatment without chaperon present.     Treatment/Session Summary:    >Treatment Assessment:  Pt is progressing well with PFPT. Pt now able to attempt penetrative intercourse with some success. She reports pain as 2/10 with intercourse and use of largest dilator.   Communication/Consultation:  None today  Equipment provided today:  None  Recommendations/Intent for next treatment session: Continue to progress vaginal dilators (size 8 x 5 minutes)  and manual therapy. Pt to transition to bi-weekly appointments.     >Total Treatment Billable Duration:  Treatment minutes 54  Time In: 1110  Time Out: 1205    Khushboo Quarles PT       Charge Capture  }Post Session Pain  PT Visit Info  [facility] Portal  MD Guidelines  Scanned Media  Benefits  MyChart    Future Appointments   Date Time Provider Department Center   3/13/2024 11:00 AM Khushboo Quarles PT [facility]

## 2024-03-13 ENCOUNTER — HOSPITAL ENCOUNTER (OUTPATIENT)
Dept: PHYSICAL THERAPY | Age: 54
Setting detail: RECURRING SERIES
Discharge: HOME | End: 2024-03-16
Attending: OBSTETRICS & GYNECOLOGY
Payer: COMMERCIAL

## 2024-03-13 PROCEDURE — 97140 MANUAL THERAPY 1/> REGIONS: CPT

## 2024-03-13 ASSESSMENT — PAIN SCALES - GENERAL: PAINLEVEL_OUTOF10: 0

## 2024-03-13 NOTE — PROGRESS NOTES
mobilization was utilized and necessary because of the patient's restricted motion of soft tissue.   Date Type Location Comments   3/13/2024 Internal assessment/treatment Via vaginal canal Superficial and deep PFM - SP, SCS, CR, STM through digital sweeping utilized. Emphasis on digital sweeping through superficial PFM.       STM External PFM, adductors (B)      Use of dilator size sizes #7 and #8                            (Used abbreviations: MET - muscle energy technique; SCS- Strain counter strain; CTM-Connective tissue mobilizations; CR- Contract/Relax; SP- Sustained pressure; PIT- Positional inhibition techniques; STM Soft -tissue mobilization; MM- Myofascial mobilization; TrP-Trigger point release; IASTM- Instrument assisted soft tissue mobilizations, TDN-Trigger point dry needling)    Pt gives verbal consent to internal vaginal assessment/treatment without chaperon present.     Treatment/Session Summary:    >Treatment Assessment:  Pt continues to progress well using home dilators. She has been able to participate in sexual intercourse. Pain has been mild with participation in intercourse, rated from 0-3/10. She is independent with her HEP at this time. We will f/u in one month if needed. Otherwise, I believe she is ready to be discharged at this time.   Communication/Consultation:  None today  Equipment provided today:  None  Recommendations/Intent for next treatment session:     >Total Treatment Billable Duration:  Treatment minutes 45  Time In: 1115  Time Out: 1205    Khushboo Quarles, PT       Charge Capture  }Post Session Pain  PT Visit Info  [facility] Portal  MD Guidelines  Scanned Media  Benefits  MyChart    No future appointments.

## 2024-11-05 RX ORDER — NORGESTREL AND ETHINYL ESTRADIOL 0.3-0.03MG
1 KIT ORAL DAILY
Qty: 3 PACKET | Refills: 1 | Status: SHIPPED | OUTPATIENT
Start: 2024-11-05

## 2024-12-05 RX ORDER — NORGESTREL AND ETHINYL ESTRADIOL 0.3-0.03MG
KIT ORAL
Qty: 84 TABLET | Refills: 4 | OUTPATIENT
Start: 2024-12-05

## 2024-12-13 ENCOUNTER — PATIENT MESSAGE (OUTPATIENT)
Dept: OBGYN CLINIC | Age: 54
End: 2024-12-13

## 2024-12-18 RX ORDER — NORETHINDRONE ACETATE AND ETHINYL ESTRADIOL 1MG-20(21)
1 KIT ORAL DAILY
Qty: 3 PACKET | Refills: 1 | Status: SHIPPED | OUTPATIENT
Start: 2024-12-18

## 2024-12-18 NOTE — TELEPHONE ENCOUNTER
"  Assessment & Plan     Essential hypertension, benign  Well controlled. Continue current medications. Encouraged daily exercise and a low sodium diet. Recommended checking BP's 2x/wk, call the clinic if consistantly s>140 or d>90. Follow up in 6 months.     - metoprolol succinate ER (TOPROL XL) 25 MG 24 hr tablet; Take 1 tablet (25 mg) by mouth daily    Giulia Fox NP  [facility] - AIDEN Cardozo is a 50 year old, presenting for the following health issues:  Hypertension      HPI     Hypertension Follow-up    Taking losartan 100 mg. Metoprolol succinate 25 mg was recently added as BP and HR were slightly high.         Do you check your blood pressure regularly outside of the clinic? Yes    Are you following a low salt diet? Yes    Are your blood pressures ever more than 140 on the top number (systolic) OR more   than 90 on the bottom number (diastolic), for example 140/90? No  Denies chest pain, shortness of breath, dizziness, syncope, or palpitations.        Review of Systems   Constitutional, HEENT, cardiovascular, pulmonary, gi and gu systems are negative, except as otherwise noted.      Objective    /78 (BP Location: Left arm, Patient Position: Sitting, Cuff Size: Adult Large)   Pulse 69   Temp 97.2  F (36.2  C) (Tympanic)   Ht 1.803 m (5' 11\")   Wt 95.8 kg (211 lb 4.8 oz)   SpO2 98%   BMI 29.47 kg/m    Body mass index is 29.47 kg/m .  Physical Exam   GENERAL: healthy, alert and no distress  EYES: Eyes grossly normal to inspection, PERRL and conjunctivae and sclerae normal  HENT: ear canals and TM's normal, nose and mouth without ulcers or lesions  NECK: no adenopathy, no asymmetry, masses, or scars and thyroid normal to palpation  RESP: lungs clear to auscultation - no rales, rhonchi or wheezes  CV: regular rate and rhythm, no murmur, click or rub, no peripheral edema  NEURO: Normal strength and tone, mentation intact and speech normal  PSYCH: mentation appears " sent   normal, affect normal/bright

## 2025-02-03 RX ORDER — NORGESTREL AND ETHINYL ESTRADIOL 0.3-0.03MG
KIT ORAL
Qty: 84 TABLET | Refills: 4 | OUTPATIENT
Start: 2025-02-03

## 2025-03-24 ENCOUNTER — PATIENT MESSAGE (OUTPATIENT)
Dept: OBGYN CLINIC | Age: 55
End: 2025-03-24

## 2025-03-25 RX ORDER — NORGESTREL-ETHINYL ESTRADIOL 0.3-0.03MG
1 TABLET ORAL DAILY
Qty: 1 PACKET | Refills: 11 | Status: SHIPPED | OUTPATIENT
Start: 2025-03-25

## 2025-04-06 ENCOUNTER — PATIENT MESSAGE (OUTPATIENT)
Dept: OBGYN CLINIC | Age: 55
End: 2025-04-06

## 2025-04-14 RX ORDER — NORGESTREL AND ETHINYL ESTRADIOL 0.3-0.03MG
1 KIT ORAL DAILY
Qty: 84 TABLET | Refills: 4 | OUTPATIENT
Start: 2025-04-14

## 2025-04-14 RX ORDER — NORGESTREL-ETHINYL ESTRADIOL 0.3-0.03MG
TABLET ORAL
Qty: 3 PACKET | Refills: 4 | Status: SHIPPED | OUTPATIENT
Start: 2025-04-14

## 2025-04-23 RX ORDER — ESTRADIOL 1 MG/1
1 TABLET ORAL DAILY
Qty: 30 TABLET | Refills: 0 | Status: SHIPPED | OUTPATIENT
Start: 2025-04-23

## 2025-04-29 ENCOUNTER — OFFICE VISIT (OUTPATIENT)
Dept: UROLOGY | Age: 55
End: 2025-04-29
Payer: COMMERCIAL

## 2025-04-29 ENCOUNTER — TELEPHONE (OUTPATIENT)
Dept: UROLOGY | Age: 55
End: 2025-04-29

## 2025-04-29 DIAGNOSIS — R30.0 DYSURIA: ICD-10-CM

## 2025-04-29 DIAGNOSIS — R10.2 PELVIC PAIN IN FEMALE: ICD-10-CM

## 2025-04-29 DIAGNOSIS — R10.2 PELVIC PAIN: ICD-10-CM

## 2025-04-29 DIAGNOSIS — R35.0 URINARY FREQUENCY: Primary | ICD-10-CM

## 2025-04-29 LAB
BILIRUBIN, URINE, POC: NEGATIVE
BLOOD URINE, POC: NORMAL
GLUCOSE URINE, POC: NEGATIVE MG/DL
KETONES, URINE, POC: NEGATIVE MG/DL
LEUKOCYTE ESTERASE, URINE, POC: NORMAL
NITRITE, URINE, POC: NEGATIVE
PH, URINE, POC: 6 (ref 4.6–8)
PROTEIN,URINE, POC: NEGATIVE MG/DL
SPECIFIC GRAVITY, URINE, POC: 1.02 (ref 1–1.03)
URINALYSIS CLARITY, POC: NORMAL
URINALYSIS COLOR, POC: NORMAL
UROBILINOGEN, POC: NORMAL MG/DL

## 2025-04-29 PROCEDURE — 81003 URINALYSIS AUTO W/O SCOPE: CPT | Performed by: NURSE PRACTITIONER

## 2025-04-29 PROCEDURE — 99204 OFFICE O/P NEW MOD 45 MIN: CPT | Performed by: NURSE PRACTITIONER

## 2025-04-29 RX ORDER — DIAZEPAM 5 MG/1
5 TABLET ORAL EVERY 6 HOURS PRN
COMMUNITY

## 2025-04-29 RX ORDER — AMLODIPINE AND OLMESARTAN MEDOXOMIL 5; 40 MG/1; MG/1
TABLET ORAL
COMMUNITY
Start: 2025-04-28

## 2025-04-29 RX ORDER — FLUTICASONE PROPIONATE 50 MCG
SPRAY, SUSPENSION (ML) NASAL
COMMUNITY
Start: 2025-02-27

## 2025-04-29 RX ORDER — SOLIFENACIN SUCCINATE 10 MG/1
10 TABLET, FILM COATED ORAL DAILY
COMMUNITY
Start: 2025-04-02 | End: 2025-04-29 | Stop reason: SDUPTHER

## 2025-04-29 RX ORDER — VILAZODONE HYDROCHLORIDE 20 MG/1
TABLET ORAL
COMMUNITY
Start: 2025-04-03

## 2025-04-29 RX ORDER — HYOSCYAMINE SULFATE 0.12 MG/1
0.25 TABLET SUBLINGUAL 3 TIMES DAILY PRN
COMMUNITY
Start: 2025-04-24

## 2025-04-29 RX ORDER — SOLIFENACIN SUCCINATE 10 MG/1
10 TABLET, FILM COATED ORAL DAILY
Qty: 90 TABLET | Refills: 1 | Status: SHIPPED | OUTPATIENT
Start: 2025-04-29 | End: 2026-05-19

## 2025-04-29 ASSESSMENT — ENCOUNTER SYMPTOMS
INDIGESTION: 0
WHEEZING: 0
ABDOMINAL PAIN: 0
SKIN LESIONS: 0
NAUSEA: 0
HEARTBURN: 0
EYE PAIN: 0
VOMITING: 0
SHORTNESS OF BREATH: 0
EYE DISCHARGE: 0
COUGH: 0
CONSTIPATION: 0
BLOOD IN STOOL: 0
DIARRHEA: 0
BACK PAIN: 0

## 2025-04-29 NOTE — PROGRESS NOTES
Never    Smokeless tobacco: Never   Substance and Sexual Activity    Alcohol use: Yes     Comment: 1/2 glass wine per month    Drug use: Never    Sexual activity: Yes     Partners: Male     Birth control/protection: Pill   Other Topics Concern    Not on file   Social History Narrative    Not on file     Social Drivers of Health     Financial Resource Strain: Not on file   Food Insecurity: Not on file   Transportation Needs: Not on file   Physical Activity: Not on file   Stress: Not on file   Social Connections: Unknown (3/19/2021)    Received from [facility]    Social Connections     Frequency of Communication with Friends and Family: Not asked     Frequency of Social Gatherings with Friends and Family: Not asked   Intimate Partner Violence: Unknown (3/19/2021)    Received from [facility]    Intimate Partner Violence     Fear of Current or Ex-Partner: Not asked     Emotionally Abused: Not asked     Physically Abused: Not asked     Sexually Abused: Not asked   Housing Stability: Not At Risk (3/9/2022)    Received from [facility]    Housing Stability     Was there a time when you did not have a steady place to sleep: Unrecognized value     Worried that the place you are staying is making you sick: Unrecognized value     Family History   Problem Relation Age of Onset    Uterine Cancer Mother     Hypertension Mother     Cancer Mother     Mental Illness Mother     Migraines Mother     Stroke Maternal Grandfather     Cancer Maternal Grandfather     Heart Disease Father     Colon Cancer Father     Cancer Father     Hypertension Father        Review of Systems  Constitutional:   Negative for fever, chills, appetite change, malaise/fatigue, headaches and weight loss.  Skin:  Negative for skin lesions, rash and itching.  Eyes:  Negative for visual disturbance, eye pain and eye discharge.  ENT:  Negative for difficulty articulating words, pain swallowing, high frequency hearing loss and dry

## 2025-04-29 NOTE — TELEPHONE ENCOUNTER
Please schedule patient for cystoscopy with bladder hydrodistention.  Scheduled with Xiomy Daniels or Mikel.    Orders have been done    Patient is hoping this can be done before the first week of June.    Thank you,  Ally

## 2025-04-30 ENCOUNTER — HOSPITAL ENCOUNTER (OUTPATIENT)
Dept: PHYSICAL THERAPY | Age: 55
Setting detail: RECURRING SERIES
Discharge: HOME | End: 2025-05-03
Attending: OBSTETRICS & GYNECOLOGY
Payer: COMMERCIAL

## 2025-04-30 DIAGNOSIS — R39.15 URINARY URGENCY: ICD-10-CM

## 2025-04-30 DIAGNOSIS — R10.2 PELVIC PAIN: Primary | ICD-10-CM

## 2025-04-30 PROCEDURE — 97162 PT EVAL MOD COMPLEX 30 MIN: CPT | Performed by: PHYSICAL THERAPIST

## 2025-04-30 PROCEDURE — 97530 THERAPEUTIC ACTIVITIES: CPT | Performed by: PHYSICAL THERAPIST

## 2025-04-30 NOTE — PROGRESS NOTES
Jyotsna TERRI Bucky  : 1970  Primary: [employer] [facility] (Janey HINTON)  Secondary:  [facility] @ [address]  [place] [address]  Phone: [phone]  Fax: [phone] Plan Frequency: 1x/week  Plan of Care/Certification Expiration Date: 25        Plan of Care/Certification Expiration Date:  Plan of Care/Certification Expiration Date: 25    Frequency/Duration: Plan Frequency: 1x/week      Time In/Out:   Time In: 1000  Time Out: 1110      PT Visit Info:         Visit Count:  1    OUTPATIENT PHYSICAL THERAPY:   Treatment Note 2025       Episode  (PFPT)               Treatment Diagnosis:     Pelvic pain  Urinary urgency    Medical/Referring Diagnosis:    Urgency of urination    Referring Physician:  Fred Delong MD MD Orders:  PT Eval and Treat   Return MD Appt:     Date of Onset 2025  Allergies:   Oxycodone-acetaminophen  Restrictions/Precautions:   None      Interventions Planned (Treatment may consist of any combination of the following):     See Assessment Note    Subjective Comments:   See IE dated 25  Initial Pain Level::      /10  Post Session Pain Level:        /10  Medications Last Reviewed:  2025  Updated Objective Findings:  See Evaluation Note from today  Treatment     THERAPEUTIC ACTIVITY: ( 30 minutes)  Patient was provided a list of common bladder irritants including caffeine, carbonation, alcohol, artificial sweeteners, chocolate, acidic drinks, and tomato based drinks., Patient education on role and use of lubricants and/or moisturizer in order to improve tissue health and decrease vaginal/vulvar irritation., and Instruction on coordinated pelvic floor and diaphragmatic breathing to improve kinesthetic awareness of pelvic muscle mobility and restore proper motor recruitment patterns with breathing, posture, and functional movement (e.g. appropriate lift/contraction with increased IAP such as a cough, laugh, sneeze to prevent

## 2025-04-30 NOTE — THERAPY EVALUATION
Jyotsna Lawler  : 1970  Primary: [employer] (Janey HINTON)  Secondary:  [facility] @ [address]  [place] [address]  Phone: [phone]  Fax: [phone] Plan Frequency: 1x/week    Plan of Care/Certification Expiration Date: 25        Plan of Care/Certification Expiration Date:  Plan of Care/Certification Expiration Date: 25    Frequency/Duration: Plan Frequency: 1x/week      Time In/Out:   Time In: 1000  Time Out: 1110      PT Visit Info:    Plan Frequency: 1x/week      Visit Count:  1                OUTPATIENT PHYSICAL THERAPY:             Initial Assessment 2025               Episode (PFPT)         Treatment Diagnosis:     Pelvic pain  Urinary urgency    Medical/Referring Diagnosis:    Urgency of urination      Referring Physician:  Fred Delong MD MD Orders:  PT Eval and Treat   Return MD Appt:    Date of Onset:      Allergies:  Oxycodone-acetaminophen  Restrictions/Precautions:    None      Medications Last Reviewed:  2025     SUBJECTIVE   History of Injury/Illness (Reason for Referral):  Ms. Lawler is a 55 yo female referred to pelvic floor physical therapy (PFPT) by Fred Delong MD 2/2 Urgency of urination [R39.15]. Urinary urgency is very strong. At times the urgency is so strong she experiences 10/10 pain. Lowest level of pain 2/10. Her pain is located supra-pubic and does have burning in her urethra.  Pain is not alleviated with voiding. Pain does not increase with voiding. She has difficulty initiating a urine stream. This can take approximately 60 seconds to initiate a stream.     : Pt switched from low Egestrel to Junel. She also went off a number of psychiatric medications (in agreement with her psychologist) due to lack of orgasm. Since she has started a new medication.    : Urinary urgency, treated for UTI with 5 antibiotics, culture was negative              :  Severe bladder spasms, went to

## 2025-05-02 PROBLEM — R10.2 PELVIC PAIN IN FEMALE: Status: ACTIVE | Noted: 2025-04-29

## 2025-05-02 PROBLEM — R30.0 DYSURIA: Status: ACTIVE | Noted: 2025-04-29

## 2025-05-02 NOTE — TELEPHONE ENCOUNTER
CYSTOSCOPY WITH BLADDER HYDRODISTENTION   Date: 5/20/2025   Time: 1128   Location: [facility] MAIN OR 01 CYSTO

## 2025-05-04 ENCOUNTER — PATIENT MESSAGE (OUTPATIENT)
Dept: UROLOGY | Age: 55
End: 2025-05-04

## 2025-05-05 ENCOUNTER — PATIENT MESSAGE (OUTPATIENT)
Dept: OBGYN CLINIC | Age: 55
End: 2025-05-05

## 2025-05-05 DIAGNOSIS — R39.89 BLADDER PAIN: Primary | ICD-10-CM

## 2025-05-05 DIAGNOSIS — N32.89 BLADDER SPASMS: ICD-10-CM

## 2025-05-05 DIAGNOSIS — R39.15 URGENCY OF URINATION: ICD-10-CM

## 2025-05-05 NOTE — TELEPHONE ENCOUNTER
Orders Placed This Encounter   Procedures    [facility] - Rhonda Hameed DO, Urogynecology, [place]     Referral Priority:   Routine     Referral Type:   Eval and Treat     Referral Reason:   Specialty Services Required     Referred to Provider:   Rhonda Hameed DO     Requested Specialty:   Female Pelvic Medicine and Reconstructive Surgery     Number of Visits Requested:   1

## 2025-05-07 RX ORDER — IBUPROFEN 200 MG
200 TABLET ORAL EVERY 6 HOURS PRN
COMMUNITY

## 2025-05-07 RX ORDER — AMITRIPTYLINE HYDROCHLORIDE 10 MG/1
20 TABLET ORAL NIGHTLY
COMMUNITY

## 2025-05-07 RX ORDER — ESTRADIOL 10 UG/1
10 TABLET, FILM COATED VAGINAL
COMMUNITY
End: 2025-05-13 | Stop reason: SDUPTHER

## 2025-05-07 NOTE — PERIOP NOTE
Patient verified name and .  Order for consent  was found in EHR and does match case posting; patient verifies procedure.       Type 1B surgery, Phone assessment complete.  Orders  received.  Labs per surgeon: CBC, BMP, UA-DOS  Labs per anesthesia protocol: no additional.    Patient answered medical/surgical history questions at their best of ability. All prior to admission medications documented in EPIC.    Patient instructed to continue taking all prescription medications up to the day of surgery but to take only the following medications the day of surgery according to anesthesia guidelines with a small sip of water: none. Also, patient is requested to take 2 Tylenol in the morning and then again before bed on the day before surgery. Regular or extra strength may be used.       Patient informed that all vitamins and supplements should be held 7 days prior to surgery and NSAIDS 5 days prior to surgery. Prescription meds to hold:Advil- hold 5 days prior to procedure.Bladder ease, Marshmallow Root, and Prelief acid reducer- hold 7 days prior to procedure.    Patient instructed on the following:    > Arrive at Main Entrance, time of arrival to be called the day before by 1700  > No food after midnight, patient may drink clear liquids up until 2 hours prior to arrival. No gum, candy, mints.   > Responsible adult must drive patient to the hospital, stay during surgery, and patient will need supervision 24 hours after anesthesia  > Use non moisturizing soap in shower the night before surgery and on the morning of surgery  > All piercings must be removed prior to arrival.    > Leave all valuables (money and jewelry) at home but bring insurance card and ID on DOS.   > You may be required to pay a deductible or co-pay on the day of your procedure. You can pre-pay by calling [phone] if your surgery is at the [facility] or [phone] if your surgery is at the [facility].  > Do not wear make-up, nail polish,

## 2025-05-10 SDOH — ECONOMIC STABILITY: FOOD INSECURITY: WITHIN THE PAST 12 MONTHS, THE FOOD YOU BOUGHT JUST DIDN'T LAST AND YOU DIDN'T HAVE MONEY TO GET MORE.: NEVER TRUE

## 2025-05-10 SDOH — ECONOMIC STABILITY: INCOME INSECURITY: IN THE LAST 12 MONTHS, WAS THERE A TIME WHEN YOU WERE NOT ABLE TO PAY THE MORTGAGE OR RENT ON TIME?: NO

## 2025-05-10 SDOH — ECONOMIC STABILITY: FOOD INSECURITY: WITHIN THE PAST 12 MONTHS, YOU WORRIED THAT YOUR FOOD WOULD RUN OUT BEFORE YOU GOT MONEY TO BUY MORE.: NEVER TRUE

## 2025-05-10 SDOH — ECONOMIC STABILITY: TRANSPORTATION INSECURITY
IN THE PAST 12 MONTHS, HAS THE LACK OF TRANSPORTATION KEPT YOU FROM MEDICAL APPOINTMENTS OR FROM GETTING MEDICATIONS?: NO

## 2025-05-10 SDOH — ECONOMIC STABILITY: TRANSPORTATION INSECURITY
IN THE PAST 12 MONTHS, HAS LACK OF TRANSPORTATION KEPT YOU FROM MEETINGS, WORK, OR FROM GETTING THINGS NEEDED FOR DAILY LIVING?: NO

## 2025-05-12 NOTE — PROGRESS NOTES
HPI:  Ms. Lawler is a 54 y.o. female who is here today for a well woman exam. She complains of \"severe bladder pain\" that started around March 25th. She reports this pain is similar to a strong urge to urinate that is constant. She reports the past 4 days the symptoms have improved. No fever or hematuria. She was seen by two urologists and is scheduled for cystoscopy hydrodistention 5/20 with [facility].    Overall lower pain levels last 4 days       She has continued dyspareunia and was seen by pelvic floor PT    Pt had urine cultures with PCP and urgent care and was treated with antibiotics but some results were negative.    She is very concerned that the initial trigger of this episode of pain is when we had decreased her oral contraceptives to a lower estrogen.  We have increased her levels back up which she has also changed medications from urologic point of view.    Allergies   Allergen Reactions    Oxycodone-Acetaminophen Itching       Current Outpatient Medications   Medication Sig Dispense Refill    ibuprofen (ADVIL) 200 MG tablet Take 1 tablet by mouth every 6 hours as needed for Pain      amitriptyline (ELAVIL) 10 MG tablet Take 2 tablets by mouth nightly      Estradiol (VAGIFEM) 10 MCG TABS vaginal tablet Place 1 tablet vaginally Twice a Week      amLODIPine-olmesartan (ROSI) 5-40 MG per tablet Take 1 tablet by mouth nightly      vilazodone HCl (VIIBRYD) 20 MG TABS Take 1 tablet by mouth every evening      hyoscyamine (LEVSIN/SL) 0.125 MG sublingual tablet Place 1 tablet under the tongue in the morning, at noon, and at bedtime      fluticasone (FLONASE) 50 MCG/ACT nasal spray 1 spray by Nasal route 2 times daily      solifenacin (VESICARE) 10 MG tablet Take 1 tablet by mouth daily (Patient taking differently: Take 1 tablet by mouth every evening) 90 tablet 1    estradiol (ESTRACE) 1 MG tablet Take 1 tablet by mouth daily (Patient taking differently: Take 1 tablet by mouth nightly) 30

## 2025-05-13 ENCOUNTER — OFFICE VISIT (OUTPATIENT)
Dept: OBGYN CLINIC | Age: 55
End: 2025-05-13
Payer: COMMERCIAL

## 2025-05-13 VITALS
WEIGHT: 134.1 LBS | BODY MASS INDEX: 21.05 KG/M2 | SYSTOLIC BLOOD PRESSURE: 114 MMHG | DIASTOLIC BLOOD PRESSURE: 72 MMHG | HEIGHT: 67 IN

## 2025-05-13 DIAGNOSIS — Z79.890 HORMONE REPLACEMENT THERAPY: ICD-10-CM

## 2025-05-13 DIAGNOSIS — N95.2 POST-MENOPAUSAL ATROPHIC VAGINITIS: ICD-10-CM

## 2025-05-13 DIAGNOSIS — Z12.31 SCREENING MAMMOGRAM FOR BREAST CANCER: ICD-10-CM

## 2025-05-13 DIAGNOSIS — Z01.419 WELL WOMAN EXAM: Primary | ICD-10-CM

## 2025-05-13 DIAGNOSIS — Z13.89 SCREENING FOR GENITOURINARY CONDITION: ICD-10-CM

## 2025-05-13 LAB
BILIRUBIN, URINE, POC: NEGATIVE
BLOOD URINE, POC: NORMAL
GLUCOSE URINE, POC: NEGATIVE
KETONES, URINE, POC: NORMAL
LEUKOCYTE ESTERASE, URINE, POC: NORMAL
NITRITE, URINE, POC: NEGATIVE
PH, URINE, POC: 6 (ref 4.6–8)
PROTEIN,URINE, POC: NEGATIVE
SPECIFIC GRAVITY, URINE, POC: 1.01 (ref 1–1.03)
URINALYSIS CLARITY, POC: CLEAR
URINALYSIS COLOR, POC: YELLOW
UROBILINOGEN, POC: NORMAL MG/DL

## 2025-05-13 PROCEDURE — 99396 PREV VISIT EST AGE 40-64: CPT | Performed by: OBSTETRICS & GYNECOLOGY

## 2025-05-13 PROCEDURE — 99459 PELVIC EXAMINATION: CPT | Performed by: OBSTETRICS & GYNECOLOGY

## 2025-05-13 PROCEDURE — 81002 URINALYSIS NONAUTO W/O SCOPE: CPT | Performed by: OBSTETRICS & GYNECOLOGY

## 2025-05-13 RX ORDER — ESTRADIOL 1 MG/1
1 TABLET ORAL DAILY
Qty: 90 TABLET | Refills: 4 | Status: SHIPPED | OUTPATIENT
Start: 2025-05-13

## 2025-05-13 RX ORDER — NORGESTREL-ETHINYL ESTRADIOL 0.3-0.03MG
TABLET ORAL
Qty: 3 PACKET | Refills: 4 | Status: SHIPPED | OUTPATIENT
Start: 2025-05-13

## 2025-05-13 RX ORDER — ESTRADIOL 10 UG/1
10 TABLET, FILM COATED VAGINAL
Qty: 24 TABLET | Refills: 4 | Status: SHIPPED | OUTPATIENT
Start: 2025-05-15

## 2025-05-19 ENCOUNTER — ANESTHESIA EVENT (OUTPATIENT)
Dept: SURGERY | Age: 55
End: 2025-05-19
Payer: COMMERCIAL

## 2025-05-20 ENCOUNTER — ANESTHESIA (OUTPATIENT)
Dept: SURGERY | Age: 55
End: 2025-05-20
Payer: COMMERCIAL

## 2025-05-20 ENCOUNTER — HOSPITAL ENCOUNTER (OUTPATIENT)
Age: 55
Setting detail: OUTPATIENT SURGERY
Discharge: HOME | End: 2025-05-20
Attending: UROLOGY | Admitting: UROLOGY
Payer: COMMERCIAL

## 2025-05-20 VITALS
TEMPERATURE: 98.5 F | WEIGHT: 133.8 LBS | HEIGHT: 66 IN | SYSTOLIC BLOOD PRESSURE: 139 MMHG | BODY MASS INDEX: 21.5 KG/M2 | DIASTOLIC BLOOD PRESSURE: 75 MMHG | OXYGEN SATURATION: 98 % | RESPIRATION RATE: 16 BRPM | HEART RATE: 62 BPM

## 2025-05-20 DIAGNOSIS — R10.2 PELVIC PAIN IN FEMALE: ICD-10-CM

## 2025-05-20 DIAGNOSIS — N94.819 VULVODYNIA: Primary | ICD-10-CM

## 2025-05-20 LAB
ANION GAP SERPL CALC-SCNC: 12 MMOL/L (ref 7–16)
APPEARANCE UR: CLEAR
BACTERIA URNS QL MICRO: NEGATIVE /HPF
BILIRUB UR QL: NEGATIVE
BUN SERPL-MCNC: 7 MG/DL (ref 6–23)
CALCIUM SERPL-MCNC: 8.8 MG/DL (ref 8.8–10.2)
CHLORIDE SERPL-SCNC: 102 MMOL/L (ref 98–107)
CO2 SERPL-SCNC: 21 MMOL/L (ref 20–29)
COLOR UR: ABNORMAL
CREAT SERPL-MCNC: 0.83 MG/DL (ref 0.6–1.1)
EPI CELLS #/AREA URNS HPF: ABNORMAL /HPF
ERYTHROCYTE [DISTWIDTH] IN BLOOD BY AUTOMATED COUNT: 13.1 % (ref 11.9–14.6)
GLUCOSE SERPL-MCNC: 83 MG/DL (ref 70–99)
GLUCOSE UR STRIP.AUTO-MCNC: NEGATIVE MG/DL
HCT VFR BLD AUTO: 38.9 % (ref 35.8–46.3)
HGB BLD-MCNC: 13.2 G/DL (ref 11.7–15.4)
HGB UR QL STRIP: NEGATIVE
HYALINE CASTS URNS QL MICRO: ABNORMAL /LPF
KETONES UR QL STRIP.AUTO: NEGATIVE MG/DL
LEUKOCYTE ESTERASE UR QL STRIP.AUTO: ABNORMAL
MCH RBC QN AUTO: 31.3 PG (ref 26.1–32.9)
MCHC RBC AUTO-ENTMCNC: 33.9 G/DL (ref 31.4–35)
MCV RBC AUTO: 92.2 FL (ref 82–102)
NITRITE UR QL STRIP.AUTO: NEGATIVE
NRBC # BLD: 0 K/UL (ref 0–0.2)
PH UR STRIP: 6.5 (ref 5–9)
PLATELET # BLD AUTO: 376 K/UL (ref 150–450)
PMV BLD AUTO: 10 FL (ref 9.4–12.3)
POTASSIUM SERPL-SCNC: 3.6 MMOL/L (ref 3.5–5.1)
PROT UR STRIP-MCNC: NEGATIVE MG/DL
RBC # BLD AUTO: 4.22 M/UL (ref 4.05–5.2)
RBC #/AREA URNS HPF: ABNORMAL /HPF
SODIUM SERPL-SCNC: 135 MMOL/L (ref 136–145)
SP GR UR REFRACTOMETRY: 1.01 (ref 1–1.02)
UROBILINOGEN UR QL STRIP.AUTO: 0.2 EU/DL (ref 0.2–1)
WBC # BLD AUTO: 8.8 K/UL (ref 4.3–11.1)
WBC URNS QL MICRO: ABNORMAL /HPF

## 2025-05-20 PROCEDURE — 6360000002 HC RX W HCPCS: Performed by: NURSE ANESTHETIST, CERTIFIED REGISTERED

## 2025-05-20 PROCEDURE — 6370000000 HC RX 637 (ALT 250 FOR IP): Performed by: ANESTHESIOLOGY

## 2025-05-20 PROCEDURE — 52260 CYSTOSCOPY AND TREATMENT: CPT | Performed by: UROLOGY

## 2025-05-20 PROCEDURE — 2580000003 HC RX 258: Performed by: ANESTHESIOLOGY

## 2025-05-20 PROCEDURE — 3700000001 HC ADD 15 MINUTES (ANESTHESIA): Performed by: UROLOGY

## 2025-05-20 PROCEDURE — 7100000011 HC PHASE II RECOVERY - ADDTL 15 MIN: Performed by: UROLOGY

## 2025-05-20 PROCEDURE — 7100000000 HC PACU RECOVERY - FIRST 15 MIN: Performed by: UROLOGY

## 2025-05-20 PROCEDURE — 6360000002 HC RX W HCPCS: Performed by: ANESTHESIOLOGY

## 2025-05-20 PROCEDURE — 87086 URINE CULTURE/COLONY COUNT: CPT

## 2025-05-20 PROCEDURE — 7100000010 HC PHASE II RECOVERY - FIRST 15 MIN: Performed by: UROLOGY

## 2025-05-20 PROCEDURE — 7100000001 HC PACU RECOVERY - ADDTL 15 MIN: Performed by: UROLOGY

## 2025-05-20 PROCEDURE — 85027 COMPLETE CBC AUTOMATED: CPT

## 2025-05-20 PROCEDURE — 81001 URINALYSIS AUTO W/SCOPE: CPT

## 2025-05-20 PROCEDURE — 3700000000 HC ANESTHESIA ATTENDED CARE: Performed by: UROLOGY

## 2025-05-20 PROCEDURE — 2709999900 HC NON-CHARGEABLE SUPPLY: Performed by: UROLOGY

## 2025-05-20 PROCEDURE — 3600000014 HC SURGERY LEVEL 4 ADDTL 15MIN: Performed by: UROLOGY

## 2025-05-20 PROCEDURE — 80048 BASIC METABOLIC PNL TOTAL CA: CPT

## 2025-05-20 PROCEDURE — 6360000002 HC RX W HCPCS: Performed by: NURSE PRACTITIONER

## 2025-05-20 PROCEDURE — 3600000004 HC SURGERY LEVEL 4 BASE: Performed by: UROLOGY

## 2025-05-20 RX ORDER — DIPHENHYDRAMINE HYDROCHLORIDE 50 MG/ML
12.5 INJECTION, SOLUTION INTRAMUSCULAR; INTRAVENOUS
Status: DISCONTINUED | OUTPATIENT
Start: 2025-05-20 | End: 2025-05-20 | Stop reason: HOSPADM

## 2025-05-20 RX ORDER — PROCHLORPERAZINE EDISYLATE 5 MG/ML
5 INJECTION INTRAMUSCULAR; INTRAVENOUS
Status: DISCONTINUED | OUTPATIENT
Start: 2025-05-20 | End: 2025-05-20 | Stop reason: HOSPADM

## 2025-05-20 RX ORDER — SODIUM CHLORIDE 9 MG/ML
INJECTION, SOLUTION INTRAVENOUS PRN
Status: DISCONTINUED | OUTPATIENT
Start: 2025-05-20 | End: 2025-05-20 | Stop reason: HOSPADM

## 2025-05-20 RX ORDER — NALOXONE HYDROCHLORIDE 0.4 MG/ML
INJECTION, SOLUTION INTRAMUSCULAR; INTRAVENOUS; SUBCUTANEOUS PRN
Status: DISCONTINUED | OUTPATIENT
Start: 2025-05-20 | End: 2025-05-20 | Stop reason: HOSPADM

## 2025-05-20 RX ORDER — FENTANYL CITRATE 50 UG/ML
100 INJECTION, SOLUTION INTRAMUSCULAR; INTRAVENOUS
Status: DISCONTINUED | OUTPATIENT
Start: 2025-05-20 | End: 2025-05-20 | Stop reason: HOSPADM

## 2025-05-20 RX ORDER — SODIUM CHLORIDE, SODIUM LACTATE, POTASSIUM CHLORIDE, CALCIUM CHLORIDE 600; 310; 30; 20 MG/100ML; MG/100ML; MG/100ML; MG/100ML
INJECTION, SOLUTION INTRAVENOUS CONTINUOUS
Status: DISCONTINUED | OUTPATIENT
Start: 2025-05-20 | End: 2025-05-20 | Stop reason: HOSPADM

## 2025-05-20 RX ORDER — SODIUM CHLORIDE 0.9 % (FLUSH) 0.9 %
5-40 SYRINGE (ML) INJECTION PRN
Status: DISCONTINUED | OUTPATIENT
Start: 2025-05-20 | End: 2025-05-20 | Stop reason: HOSPADM

## 2025-05-20 RX ORDER — DEXTROSE MONOHYDRATE 100 MG/ML
INJECTION, SOLUTION INTRAVENOUS CONTINUOUS PRN
Status: DISCONTINUED | OUTPATIENT
Start: 2025-05-20 | End: 2025-05-20 | Stop reason: HOSPADM

## 2025-05-20 RX ORDER — DEXAMETHASONE SODIUM PHOSPHATE 4 MG/ML
INJECTION, SOLUTION INTRA-ARTICULAR; INTRALESIONAL; INTRAMUSCULAR; INTRAVENOUS; SOFT TISSUE
Status: DISCONTINUED | OUTPATIENT
Start: 2025-05-20 | End: 2025-05-20 | Stop reason: SDUPTHER

## 2025-05-20 RX ORDER — FENTANYL CITRATE 50 UG/ML
INJECTION, SOLUTION INTRAMUSCULAR; INTRAVENOUS
Status: DISCONTINUED | OUTPATIENT
Start: 2025-05-20 | End: 2025-05-20 | Stop reason: SDUPTHER

## 2025-05-20 RX ORDER — ACETAMINOPHEN 500 MG
1000 TABLET ORAL ONCE
Status: COMPLETED | OUTPATIENT
Start: 2025-05-20 | End: 2025-05-20

## 2025-05-20 RX ORDER — OXYBUTYNIN CHLORIDE 5 MG/1
5 TABLET ORAL ONCE
Status: COMPLETED | OUTPATIENT
Start: 2025-05-20 | End: 2025-05-20

## 2025-05-20 RX ORDER — IBUPROFEN 600 MG/1
1 TABLET ORAL PRN
Status: DISCONTINUED | OUTPATIENT
Start: 2025-05-20 | End: 2025-05-20 | Stop reason: HOSPADM

## 2025-05-20 RX ORDER — ONDANSETRON 2 MG/ML
INJECTION INTRAMUSCULAR; INTRAVENOUS
Status: DISCONTINUED | OUTPATIENT
Start: 2025-05-20 | End: 2025-05-20 | Stop reason: SDUPTHER

## 2025-05-20 RX ORDER — LIDOCAINE HYDROCHLORIDE 20 MG/ML
INJECTION, SOLUTION EPIDURAL; INFILTRATION; INTRACAUDAL; PERINEURAL
Status: DISCONTINUED | OUTPATIENT
Start: 2025-05-20 | End: 2025-05-20 | Stop reason: SDUPTHER

## 2025-05-20 RX ORDER — SODIUM CHLORIDE 0.9 % (FLUSH) 0.9 %
5-40 SYRINGE (ML) INJECTION EVERY 12 HOURS SCHEDULED
Status: DISCONTINUED | OUTPATIENT
Start: 2025-05-20 | End: 2025-05-20 | Stop reason: HOSPADM

## 2025-05-20 RX ORDER — PROPOFOL 10 MG/ML
INJECTION, EMULSION INTRAVENOUS
Status: DISCONTINUED | OUTPATIENT
Start: 2025-05-20 | End: 2025-05-20 | Stop reason: SDUPTHER

## 2025-05-20 RX ORDER — MIDAZOLAM HYDROCHLORIDE 2 MG/2ML
2 INJECTION, SOLUTION INTRAMUSCULAR; INTRAVENOUS
Status: DISCONTINUED | OUTPATIENT
Start: 2025-05-20 | End: 2025-05-20 | Stop reason: HOSPADM

## 2025-05-20 RX ORDER — HYDROCODONE BITARTRATE AND ACETAMINOPHEN 5; 325 MG/1; MG/1
1 TABLET ORAL EVERY 6 HOURS PRN
Qty: 12 TABLET | Refills: 0 | Status: SHIPPED | OUTPATIENT
Start: 2025-05-20 | End: 2025-05-23

## 2025-05-20 RX ORDER — LIDOCAINE HYDROCHLORIDE 10 MG/ML
1 INJECTION, SOLUTION INFILTRATION; PERINEURAL
Status: DISCONTINUED | OUTPATIENT
Start: 2025-05-20 | End: 2025-05-20 | Stop reason: HOSPADM

## 2025-05-20 RX ADMIN — ACETAMINOPHEN 1000 MG: 500 TABLET, FILM COATED ORAL at 09:57

## 2025-05-20 RX ADMIN — LIDOCAINE HYDROCHLORIDE 100 MG: 20 INJECTION, SOLUTION EPIDURAL; INFILTRATION; INTRACAUDAL; PERINEURAL at 11:49

## 2025-05-20 RX ADMIN — ONDANSETRON 4 MG: 2 INJECTION, SOLUTION INTRAMUSCULAR; INTRAVENOUS at 12:21

## 2025-05-20 RX ADMIN — DEXAMETHASONE SODIUM PHOSPHATE 4 MG: 4 INJECTION INTRA-ARTICULAR; INTRALESIONAL; INTRAMUSCULAR; INTRAVENOUS; SOFT TISSUE at 11:52

## 2025-05-20 RX ADMIN — SODIUM CHLORIDE, SODIUM LACTATE, POTASSIUM CHLORIDE, AND CALCIUM CHLORIDE: .6; .31; .03; .02 INJECTION, SOLUTION INTRAVENOUS at 09:51

## 2025-05-20 RX ADMIN — PROPOFOL 200 MG: 10 INJECTION, EMULSION INTRAVENOUS at 11:49

## 2025-05-20 RX ADMIN — FENTANYL CITRATE 50 MCG: 50 INJECTION, SOLUTION INTRAMUSCULAR; INTRAVENOUS at 12:07

## 2025-05-20 RX ADMIN — OXYBUTYNIN CHLORIDE 5 MG: 5 TABLET ORAL at 13:47

## 2025-05-20 RX ADMIN — HYDROMORPHONE HYDROCHLORIDE 0.5 MG: 1 INJECTION, SOLUTION INTRAMUSCULAR; INTRAVENOUS; SUBCUTANEOUS at 13:08

## 2025-05-20 RX ADMIN — Medication 2000 MG: at 11:54

## 2025-05-20 ASSESSMENT — PAIN DESCRIPTION - LOCATION: LOCATION: OTHER (COMMENT)

## 2025-05-20 ASSESSMENT — PAIN - FUNCTIONAL ASSESSMENT: PAIN_FUNCTIONAL_ASSESSMENT: 0-10

## 2025-05-20 ASSESSMENT — PAIN DESCRIPTION - PAIN TYPE: TYPE: SURGICAL PAIN

## 2025-05-20 ASSESSMENT — PAIN DESCRIPTION - DESCRIPTORS
DESCRIPTORS: BURNING
DESCRIPTORS: BURNING;SPASM
DESCRIPTORS: ACHING;DISCOMFORT;CRAMPING;SORE

## 2025-05-20 ASSESSMENT — PAIN SCALES - GENERAL
PAINLEVEL_OUTOF10: 6
PAINLEVEL_OUTOF10: 5

## 2025-05-20 ASSESSMENT — PAIN DESCRIPTION - ORIENTATION
ORIENTATION: ANTERIOR;LOWER
ORIENTATION: ANTERIOR;LOWER

## 2025-05-20 NOTE — BRIEF OP NOTE
Brief Postoperative Note      Patient: Jyotsna Lawler  YOB: 1970  MRN: [id number]    Date of Procedure: 5/20/2025    Pre-Op Diagnosis Codes:      * Pelvic pain in female [R10.2]     * Dysuria [R30.0]    Post-Op Diagnosis: Same       Procedure(s):  CYSTOSCOPY WITH BLADDER HYDRODISTENTION    Surgeon(s):  Lisa Morin Jr., MD    Assistant:  * No surgical staff found *    Anesthesia: General    Estimated Blood Loss (mL): Minimal    Complications: None    Specimens:   ID Type Source Tests Collected by Time Destination   1 : urine Urine Urine, Cystoscopic CULTURE, URINE, URINALYSIS Lisa Morin Jr., MD 5/20/2025 1207        Implants:  * No implants in log *      Drains: * No LDAs found *    Findings:  Infection Present At Time Of Surgery (PATOS) (choose all levels that have infection present):  No infection present  Other Findings: see op note    Electronically signed by LISA MORIN JR, MD on 5/20/2025 at 12:33 PM

## 2025-05-20 NOTE — DISCHARGE INSTRUCTIONS
If you have had surgery in the past 7-10 days by one of our providers and are having fever, bleeding, or drainage from an incision, have an opening in an incision, or having issues urinating properly, please call [phone] during normal office hours. Please call [phone] for any immediate needs AFTER HOURS.     Your Recovery  Bladder augmentation is surgery to make the bladder larger and improve its ability to stretch. After surgery, your bladder should be able to hold more urine. After surgery, you may feel weak and tired at first. You will probably feel some pain or cramping in your lower belly and need pain medicine for a week or two.  Try to avoid heavy lifting and strenuous activities that might put extra pressure on your bladder while you recover.  This care sheet gives you a general idea about how long it will take for you to recover. But each person recovers at a different pace. Follow the steps below to get better as quickly as possible.  How can you care for yourself at home?  Activity  Rest when you feel tired. Getting enough sleep will help you recover.  Try to walk each day. Start by walking a little more than you did the day before. Bit by bit, increase the amount you walk. Walking boosts blood flow and helps prevent pneumonia and constipation.  Avoid strenuous activities, such as bicycle riding, jogging, weight lifting, or aerobic exercise, for 6 to 8 weeks or until your doctor says it is okay.  For 6 to 8 weeks or until your doctor says it is okay, avoid lifting anything that would make you strain. This may include a child, heavy grocery bags and milk containers, a heavy briefcase or backpack, cat litter or dog food bags, or a vacuum .  Ask your doctor when you can drive again.  You will probably need to take 72 hours off from work. It depends on the type of work you do and how you feel.  You may shower as usual. Do not take a bath until your doctor tells you it is okay.  Ask your  general anesthesia or intravenous sedation, for 24 hours or while taking prescription Narcotics:  Limit your activities  A responsible adult needs to be with you for the next 24 hours  Do not drive and operate hazardous machinery  Do not make important personal or business decisions  Do not drink alcoholic beverages  If you have not urinated within 8 hours after discharge, and you are experiencing discomfort from urinary retention, please go to the nearest ED.  If you have sleep apnea and have a CPAP machine, please use it for all naps and sleeping.  Please use caution when taking narcotics and any of your home medications that may cause drowsiness.  *  Please give a list of your current medications to your Primary Care Provider.  *  Please update this list whenever your medications are discontinued, doses are      changed, or new medications (including over-the-counter products) are added.  *  Please carry medication information at all times in case of emergency situations.    These are general instructions for a healthy lifestyle:  No smoking/ No tobacco products/ Avoid exposure to second hand smoke  Surgeon General's Warning:  Quitting smoking now greatly reduces serious risk to your health.  Obesity, smoking, and sedentary lifestyle greatly increases your risk for illness  A healthy diet, regular physical exercise & weight monitoring are important for maintaining a healthy lifestyle    You may be retaining fluid if you have a history of heart failure or if you experience any of the following symptoms:  Weight gain of 3 pounds or more overnight or 5 pounds in a week, increased swelling in our hands or feet or shortness of breath while lying flat in bed.  Please call your doctor as soon as you notice any of these symptoms; do not wait until your next office visit.

## 2025-05-20 NOTE — H&P
Jyotsna Lawler  : 1970         Chief Complaint   Patient presents with    New Patient    Urinary Tract Infection            HPI      Jyotsna Lawler is a 54 y.o. female  Here today with ongoing issues with UTI symptoms.  She says her symptoms all began in February.  Symptoms include bladder pain, urgency, pressure, throbbing and occasional burning.  Her culture results are as below  2024 greater than 100,000 colonies of E. coli  2024 greater than 100,000 colonies of E. coli  10/11/2024 no growth  2025 no growth  3/3/2025 urine culture.  No growth  3/21/2025 urine culture no growth  3/25/2025 urine culture no growth  2025 urine culture negative.     Patient tells me that in February she was initially given an antibiotic she took the complete round of antibiotic however her symptoms did not completely diminish.  The symptoms actually increased just prior to her finishing medication.  She has used AZO as needed for bladder pain.     She had a CT scan done in March.  CT without contrast showed no  abnormality.  As of note she found that her symptoms actually started after her she has since had the medication increased.  Hormone medication was decreased.  She has not noticed any other triggers.  There is nothing that she eats or drinks that causes her symptoms to be worse.  The pain is worse with a full bladder and even when urinating.  No significant urinary incontinence.  She does have pain with intercourse.     Patient has significant history of 1 pregnancy with  delivery.  She also has history of endometriosis.     She has been evaluated by [facility] urology.     It was recommended that patient return for cystoscopy.  That is scheduled sometime in .  They had mentioned possible diagnosis of interstitial cystitis.  She was also prescribed solifenacin to take for urinary frequency.     She is here today for second opinion to discuss symptoms.  She is accompanied by her

## 2025-05-20 NOTE — ANESTHESIA PRE PROCEDURE
Hypertension 2023   • Infertility, female    • OCD (obsessive compulsive disorder)    • Pelvic pain in female     surgery scheduled on 25   • UTI (urinary tract infection) 2024   • Vitamin D deficiency        Past Surgical History:        Procedure Laterality Date   •  SECTION     • COLONOSCOPY      x3   • HEENT      oral surgery x2   • LAPAROSCOPY     • SINUS SURGERY     • WISDOM TOOTH EXTRACTION         Social History:    Social History     Tobacco Use   • Smoking status: Never   • Smokeless tobacco: Never   Substance Use Topics   • Alcohol use: Yes     Comment: 1/2 glass wine per month                                Counseling given: Not Answered      Vital Signs (Current):   Vitals:    25 1005 25 0939   BP:  130/80   Pulse:  85   Resp:  18   Temp:  98.2 °F (36.8 °C)   TempSrc:  Temporal   SpO2:  100%   Weight: 59 kg (130 lb) 60.7 kg (133 lb 12.8 oz)   Height: 1.676 m (5' 6\") 1.676 m (5' 6\")                                              BP Readings from Last 3 Encounters:   25 130/80   25 114/72   24 122/80       NPO Status: Time of last liquid consumption: 645                        Time of last solid consumption:                         Date of last liquid consumption: 25                        Date of last solid food consumption: 25    BMI:   Wt Readings from Last 3 Encounters:   25 60.7 kg (133 lb 12.8 oz)   25 60.8 kg (134 lb 1.6 oz)   24 65.8 kg (145 lb)     Body mass index is 21.6 kg/m².    CBC: No results found for: \"WBC\", \"RBC\", \"HGB\", \"HCT\", \"MCV\", \"RDW\", \"PLT\"    CMP: No results found for: \"NA\", \"K\", \"CL\", \"CO2\", \"BUN\", \"CREATININE\", \"GFRAA\", \"AGRATIO\", \"LABGLOM\", \"GLUCOSE\", \"GLU\", \"CALCIUM\", \"BILITOT\", \"ALKPHOS\", \"AST\", \"ALT\"    POC Tests: No results for input(s): \"POCGLU\", \"POCNA\", \"POCK\", \"POCCL\", \"POCBUN\", \"POCHEMO\", \"POCHCT\" in the last 72 hours.    Coags: No results found for: \"PROTIME\", \"INR\",  Oriented - self; Oriented - place; Oriented - time

## 2025-05-20 NOTE — OP NOTE
[address]  [address]                            OPERATIVE REPORT      PATIENT NAME: VIBHA JASSO                : 1970  MED REC NO: [id number]                       ROOM: [facility] NO: [id number]                       ADMIT DATE: 2025  PROVIDER: Win Morin Jr, MD    DATE OF SERVICE:  2025    PREOPERATIVE DIAGNOSES:  Pelvic pain.    POSTOPERATIVE DIAGNOSES:  Pelvic pain with addition of atrophic vaginitis.    PROCEDURES PERFORMED:  Cystoscopy with hydrodistention of the bladder.    SURGEON:  Win Morin Jr, MD    ASSISTANT:  None.    ANESTHESIA:  General.    ESTIMATED BLOOD LOSS:  None.    SPECIMENS REMOVED:  Bladder urine for urinalysis and culture.    INTRAOPERATIVE FINDINGS:  Bladder capacity of 1000 mL under anesthesia, no glomerulations seen, evidence of vaginal atrophy with erythematous vaginal mucosa and mild urethral caruncle.     COMPLICATIONS:  None.    IMPLANTS:  none    INDICATIONS:  The patient with irritative voiding symptoms, pelvic pain, and vaginal pain.    DESCRIPTION OF PROCEDURE:  The patient was given a general anesthetic, placed in the dorsal lithotomy position.  She was prepped and draped in a sterile fashion.  Examination shows some mild-to-moderate redness of the mucosa of the vaginal introitus.  The urethral meatus shows mild erythema as well.  There was good anterior vaginal support.    A 22-Icelandic cystoscope was advanced into the bladder using the video camera and 30-degree lens.  I obtained urine from the side port of the scope and this was sent for urinalysis and culture.  The patient had mild pyuria on preoperative urine testing, but no bacteriuria and no worsening of her symptoms.    Bladder was inspected.  The mucosa appears normal.  No evidence of cystitis.  There were no tumors.  Both ureteral orifices were normal.    The bladder was distended by holding the irrigation bag 80  cm anterior to the level of bladder.  It was held at full distention for about 8 minutes and then allowed to drain.    The capacity was 1000 mL approximately.  Bladder was inspected after drainage.  There was no evidence of any injury.  There were no glomerulations noted.    I then instilled 30 mL of Marcaine in the bladder and removed the scope.    PLAN:  She will be discharged home.  Followup appointment on 06/30/2025.        MD MADIHA MOTT JR/NASIMA  D:  05/20/2025 12:33:33  T:  05/20/2025 17:42:44  JOB #:  [id number]

## 2025-05-20 NOTE — ANESTHESIA POSTPROCEDURE EVALUATION
Department of Anesthesiology  Postprocedure Note    Patient: Jyotsna Lawler  MRN: [id number]  YOB: 1970  Date of evaluation: 5/20/2025    Procedure Summary       Date: 05/20/25 Room / Location: [facility] MAIN OR 01 CYSTO / [facility] MAIN OR    Anesthesia Start: 1140 Anesthesia Stop: 1233    Procedure: CYSTOSCOPY WITH BLADDER HYDRODISTENTION (Bladder) Diagnosis:       Pelvic pain in female      Dysuria      (Pelvic pain in female [R10.2])      (Dysuria [R30.0])    Providers: Win Morin Jr., MD Responsible Provider: Emory Quijano MD    Anesthesia Type: General ASA Status: 2            Anesthesia Type: General    Iona Phase I: Iona Score: 10    Iona Phase II: Iona Score: 10    Anesthesia Post Evaluation    Patient location during evaluation: PACU  Patient participation: complete - patient participated  Level of consciousness: awake and alert  Airway patency: patent  Nausea: well controlled.  Cardiovascular status: acceptable.  Respiratory status: acceptable  Hydration status: stable  Pain management: adequate    No notable events documented.

## 2025-05-22 LAB
BACTERIA SPEC CULT: NORMAL
SERVICE CMNT-IMP: NORMAL

## 2025-05-23 RX ORDER — ESTRADIOL 0.1 MG/G
1 CREAM VAGINAL
Qty: 1 EACH | Refills: 3 | Status: SHIPPED | OUTPATIENT
Start: 2025-05-23

## 2025-05-27 ENCOUNTER — LAB (OUTPATIENT)
Dept: OBGYN CLINIC | Age: 55
End: 2025-05-27

## 2025-05-27 DIAGNOSIS — R10.2 PELVIC PAIN IN FEMALE: Primary | ICD-10-CM

## 2025-05-27 LAB — ESTRADIOL SERPL-MCNC: 38.9 PG/ML

## 2025-05-28 ENCOUNTER — RESULTS FOLLOW-UP (OUTPATIENT)
Dept: OBGYN CLINIC | Age: 55
End: 2025-05-28

## 2025-05-28 ENCOUNTER — HOSPITAL ENCOUNTER (OUTPATIENT)
Dept: PHYSICAL THERAPY | Age: 55
Setting detail: RECURRING SERIES
Discharge: HOME | End: 2025-05-31
Attending: OBSTETRICS & GYNECOLOGY
Payer: COMMERCIAL

## 2025-05-28 PROCEDURE — 97530 THERAPEUTIC ACTIVITIES: CPT | Performed by: PHYSICAL THERAPIST

## 2025-05-28 PROCEDURE — 97140 MANUAL THERAPY 1/> REGIONS: CPT | Performed by: PHYSICAL THERAPIST

## 2025-05-28 ASSESSMENT — PAIN SCALES - GENERAL: PAINLEVEL_OUTOF10: 2

## 2025-05-28 NOTE — PROGRESS NOTES
Transverse Perineal [x] Right  [x] Left  []DNT Puborectalis [] Right  [] Left  []DNT   Ischiocavernosus [x] Right  [x] Left  []DNT Compressor Urethra [] Right  [] Left  []DNT Pubococcygeus [] Right  [] Left  []DNT   Bulbocavernosus [x] Right  [x] Left  []DNT   Iliococcygeus [x] Right  [x] Left  []DNT       Obturator Internus [x] Right  [x] Left  []DNT       Coccygeus [] Right  [] Left  []DNT      Treatment     THERAPEUTIC ACTIVITY: (30 minutes):     TA Educational Topic Notes Date Completed   Pathology/Anatomy/PFM Function Reviewed 5/28/25   Bladder health education Reviewed, bladder health, role of musculature in urge sensation and evacuation  Review of bladder irritants 5/28/25   Urinary urge suppression     The knack     Voiding strategies     Nocturia tips     Bowel/Bladder log     Bowel health education     Constipation care     Diarrhea/Fecal leakage     Colonic massage     Toilet positioning     Defecation dynamics     Sources of fiber     Return to intercourse/vaginal trainers/wand     Perineal massage     Sexual positioning     Lubricants/vaginal moisturizers     Vulvovaginal health/vaginal irritants Reviewed  Application of estrogen cream abad-urethral, use of Aquaphor applied to vulvar tissue as a barrier to prevent burning during urination 5/28/25   Body mechanics     Posture/ergonomics     Diaphragmatic breathing 3x/day  5/28/25   Pain science education     Resources and technology     Other patient education          Chaperone for Intimate Exam  Pt gives verbal consent to internal vaginal assessment/treatment.    MANUAL THERAPY: (23 minutes):   Soft tissue mobilization was utilized and necessary because of the patient's restricted motion of soft tissue.   Date Type Location Comments   5/28/2025 Internal assessment/treatment Via vaginal canal SP applied to superficial, intermediate, and deep PFM to assist in PFM mobility and ROM. Digital sweeping to inc. Blood flow to PFM.    STM External PFM, labia

## 2025-06-05 ENCOUNTER — HOSPITAL ENCOUNTER (OUTPATIENT)
Dept: PHYSICAL THERAPY | Age: 55
Setting detail: RECURRING SERIES
Discharge: HOME | End: 2025-06-08
Attending: OBSTETRICS & GYNECOLOGY
Payer: COMMERCIAL

## 2025-06-05 PROCEDURE — 97140 MANUAL THERAPY 1/> REGIONS: CPT | Performed by: PHYSICAL THERAPIST

## 2025-06-05 PROCEDURE — 97530 THERAPEUTIC ACTIVITIES: CPT | Performed by: PHYSICAL THERAPIST

## 2025-06-05 PROCEDURE — 97110 THERAPEUTIC EXERCISES: CPT | Performed by: PHYSICAL THERAPIST

## 2025-06-05 NOTE — PROGRESS NOTES
Chaperone for Intimate Exam  Pt gives verbal consent to internal vaginal assessment/treatment.    MANUAL THERAPY: (35 minutes):   Soft tissue mobilization was utilized and necessary because of the patient's restricted motion of soft tissue.   Date Type Location Comments   6/5/2025 Internal assessment/treatment Via vaginal canal SP applied to superficial, intermediate, and deep PFM to assist in PFM mobility and ROM. Digital sweeping to inc. Blood flow to PFM.    STM External PFM, labia                                  (Used abbreviations: MET - muscle energy technique; SCS- Strain counter strain; CTM-Connective tissue mobilizations; CR- Contract/Relax; SP- Sustained pressure; PIT- Positional inhibition techniques; STM Soft -tissue mobilization; MM- Myofascial mobilization; TrP-Trigger point release; IASTM- Instrument assisted soft tissue mobilizations, TDN-Trigger point dry needling)    Pt gives verbal consent to internal vaginal assessment/treatment without chaperon present.       THERAPEUTIC EXERCISE:  (12 min)  PFM relaxation positioning  - Supine happy baby x 5 DB  - Supine butterfly supported with LE's on pillows x 5 DB  - CP x 5 DB  - LE's extended and supported in hip ER x 5 DB    Treatment/Session Summary:    Treatment Assessment:   Pt with increased erythema present through hearts line, likely contributing to vulvar pain and chronic muscle tension patterns. Pt with burning rated as 3/10 post tx. Unable to modulate sx's with PFM relaxation/positioning.     Communication/Consultation:    Equipment provided today:  HEP  Recommendations/Intent for next treatment session: PFM down-training.    >Total Treatment Billable Duration:   Treatment minutes 54  \  Time In: 0159  Time Out: 0257    Khushboo Quarles PT         Charge Capture  Events  [employer] Portal  Appt Desk  Attendance Report     Future Appointments   Date Time Provider Department Center   6/19/2025 11:00 AM Khushboo Quarles PT [facility]   6/30/2025  9:45 AM

## 2025-06-09 ENCOUNTER — PATIENT MESSAGE (OUTPATIENT)
Dept: OBGYN CLINIC | Age: 55
End: 2025-06-09

## 2025-06-09 DIAGNOSIS — N94.89 VAGINAL BURNING: Primary | ICD-10-CM

## 2025-06-09 DIAGNOSIS — R10.2 VAGINAL PAIN: ICD-10-CM

## 2025-06-09 RX ORDER — CLOBETASOL PROPIONATE 0.5 MG/G
OINTMENT TOPICAL
Qty: 45 G | Refills: 1 | Status: SHIPPED | OUTPATIENT
Start: 2025-06-09

## 2025-06-10 NOTE — TELEPHONE ENCOUNTER
Orders Placed This Encounter   Procedures    External Referral to Gynecology     Referral Priority:   Routine     Referral Type:   Eval and Treat     Referral Reason:   Specialty Services Required     Referred to Provider:   Jessa Harper MD     Requested Specialty:   Gynecology     Number of Visits Requested:   1

## 2025-06-13 ENCOUNTER — HOSPITAL ENCOUNTER (OUTPATIENT)
Dept: PHYSICAL THERAPY | Age: 55
Setting detail: RECURRING SERIES
Discharge: HOME | End: 2025-06-16
Attending: OBSTETRICS & GYNECOLOGY
Payer: COMMERCIAL

## 2025-06-13 PROCEDURE — 97140 MANUAL THERAPY 1/> REGIONS: CPT | Performed by: PHYSICAL THERAPIST

## 2025-06-13 PROCEDURE — 97110 THERAPEUTIC EXERCISES: CPT | Performed by: PHYSICAL THERAPIST

## 2025-06-13 PROCEDURE — 97530 THERAPEUTIC ACTIVITIES: CPT | Performed by: PHYSICAL THERAPIST

## 2025-06-13 ASSESSMENT — PAIN SCALES - GENERAL: PAINLEVEL_OUTOF10: 2

## 2025-06-13 NOTE — PROGRESS NOTES
Sources of fiber     Return to intercourse/vaginal trainers/wand     Perineal massage     Sexual positioning     Lubricants/vaginal moisturizers     Vulvovaginal health/vaginal irritants Reviewed  Application of estrogen cream abad-urethral, use of Aquaphor applied to vulvar tissue as a barrier to prevent burning during urination 5/28/25   Body mechanics     Posture/ergonomics Use of cut out pillow for vulvar unloading  Postural edu     6/13/25   Diaphragmatic breathing 3x/day  5/28/25   Pain science education     Resources and technology     Other patient education          Chaperone for Intimate Exam  Pt gives verbal consent to internal vaginal assessment/treatment.    MANUAL THERAPY: (35 minutes):   Soft tissue mobilization was utilized and necessary because of the patient's restricted motion of soft tissue.   Date Type Location Comments   6/13/2025 Internal assessment/treatment Via vaginal canal SP applied to superficial, intermediate, and deep PFM to assist in PFM mobility and ROM. Digital sweeping to inc. Blood flow to PFM.    STM External PFM, labia                                  (Used abbreviations: MET - muscle energy technique; SCS- Strain counter strain; CTM-Connective tissue mobilizations; CR- Contract/Relax; SP- Sustained pressure; PIT- Positional inhibition techniques; STM Soft -tissue mobilization; MM- Myofascial mobilization; TrP-Trigger point release; IASTM- Instrument assisted soft tissue mobilizations, TDN-Trigger point dry needling)    Pt gives verbal consent to internal vaginal assessment/treatment without chaperon present.       THERAPEUTIC EXERCISE:  (8 min)  PFM relaxation positioning  - Supine happy baby x 5 DB  - Supine hip ER/IR x 10  - HEP review    Treatment/Session Summary:    Treatment Assessment:   Pt cont. To present with increased erythema present through hearts line, likely contributing to vulvar pain and chronic muscle tension patterns. Pt coordinating PFM drops well. 
Warm

## 2025-06-19 ENCOUNTER — HOSPITAL ENCOUNTER (OUTPATIENT)
Dept: PHYSICAL THERAPY | Age: 55
Setting detail: RECURRING SERIES
Discharge: HOME | End: 2025-06-22
Attending: OBSTETRICS & GYNECOLOGY
Payer: COMMERCIAL

## 2025-06-19 PROCEDURE — 97110 THERAPEUTIC EXERCISES: CPT | Performed by: PHYSICAL THERAPIST

## 2025-06-19 PROCEDURE — 97140 MANUAL THERAPY 1/> REGIONS: CPT | Performed by: PHYSICAL THERAPIST

## 2025-06-19 NOTE — PROGRESS NOTES
Jyotsna Lawler  : 1970  Primary: [employer] ([facility])  Secondary:  [facility] @ [address]  [facility] [address]  Phone: [phone]  Fax: [phone] Plan Frequency: 1x/week  Plan of Care/Certification Expiration Date: 25        Plan of Care/Certification Expiration Date:  Plan of Care/Certification Expiration Date: 25    Frequency/Duration: Plan Frequency: 1x/week      Time In/Out:   Time In: 1106  Time Out: 1156      PT Visit Info:         Visit Count:  5    OUTPATIENT PHYSICAL THERAPY:   Treatment Note 2025       Episode  (PFPT)               Treatment Diagnosis:     Pelvic pain  Urinary urgency    Medical/Referring Diagnosis:    Urgency of urination    Referring Physician:  Fred Delong MD MD Orders:  PT Eval and Treat   Return MD Appt:     Date of Onset 2025  Allergies:   Oxycodone-acetaminophen  Restrictions/Precautions:   None      Interventions Planned (Treatment may consist of any combination of the following):     See Assessment Note    Subjective Comments:   25:  Denies bladder pain.   Denies dysuria.  Denies urinary urgency. Primarily experiencing vulvar burning.   Sitting on cushion more.   4-5x/day pain is a 2-3/10. Pain is not constant. Pt unsure of pain duration when episodes occur. Pt states \"maybe 15 minutes\".   Without pain following previous session.   Practicing breathing consistently and reminding her self to relax/drop her PF.     25:  Pt continues to experience vulvar pain in sitting. Her maximum sitting tolerance is 10 minutes prior to need to stand. Describes poking sensation on the left side.   Pt set-up appointment with Dr. Harper - October.   \"I could give a little more focus to relaxing the pelvic floor\"  Reduced discomfort with stretches overall.   Clitoral sensitivity   Urinary urgency: \"A lot better\" Bladder discomfort has lessened overall in the last 3 weeks. No major flairs to report.

## 2025-06-20 RX ORDER — CLOBETASOL PROPIONATE 0.5 MG/G
OINTMENT TOPICAL
Qty: 45 G | Refills: 1 | OUTPATIENT
Start: 2025-06-20

## 2025-06-20 RX ORDER — ESTRADIOL 0.1 MG/G
1 CREAM VAGINAL
Qty: 1 EACH | Refills: 3 | OUTPATIENT
Start: 2025-06-23

## 2025-06-30 ENCOUNTER — PATIENT MESSAGE (OUTPATIENT)
Dept: OBGYN CLINIC | Age: 55
End: 2025-06-30

## 2025-06-30 ENCOUNTER — OFFICE VISIT (OUTPATIENT)
Dept: UROLOGY | Age: 55
End: 2025-06-30
Payer: COMMERCIAL

## 2025-06-30 DIAGNOSIS — Z79.890 HORMONE REPLACEMENT THERAPY: ICD-10-CM

## 2025-06-30 DIAGNOSIS — R39.15 URINARY URGENCY: ICD-10-CM

## 2025-06-30 DIAGNOSIS — R10.2 PELVIC PAIN IN FEMALE: Primary | ICD-10-CM

## 2025-06-30 DIAGNOSIS — R10.2 PELVIC PAIN IN FEMALE: ICD-10-CM

## 2025-06-30 PROCEDURE — 81003 URINALYSIS AUTO W/O SCOPE: CPT | Performed by: NURSE PRACTITIONER

## 2025-06-30 PROCEDURE — 99214 OFFICE O/P EST MOD 30 MIN: CPT | Performed by: NURSE PRACTITIONER

## 2025-06-30 RX ORDER — OXYBUTYNIN CHLORIDE 5 MG/1
5 TABLET ORAL 3 TIMES DAILY PRN
Qty: 30 TABLET | Refills: 11 | Status: SHIPPED | OUTPATIENT
Start: 2025-06-30

## 2025-06-30 ASSESSMENT — ENCOUNTER SYMPTOMS
BACK PAIN: 0
NAUSEA: 0

## 2025-06-30 NOTE — PROGRESS NOTES
[facility]  [address] [place] [id number]  [phone]          Jyotsna Lawler  : 1970    Chief Complaint   Patient presents with    Follow-up          HPI     Jyotsna Lawler is a 54 y.o. female  Returns today for follow-up after undergoing a cystoscopy bladder hydrodistention.  At the time of the hydrodistention the bladder capacity was around 1000 mL.  Upon completion of the hydrodistention there were no glomerulations seen.  Patient did have a lot of pressure and urgency and pain immediately after the procedure.  That has somewhat improved.  She continues to use solifenacin 10 mg p.o. daily.  She has also used Levsin sublingual for bladder pain.  There are days however that she still has urgency.    Initially she was referred due to  ongoing issues with UTI symptoms.  She says her symptoms all began in February.  Symptoms include bladder pain, urgency, pressure, throbbing and occasional burning.  Her culture results are as below  2024 greater than 100,000 colonies of E. coli  2024 greater than 100,000 colonies of E. coli  10/11/2024 no growth  2025 no growth  3/3/2025 urine culture.  No growth  3/21/2025 urine culture no growth  3/25/2025 urine culture no growth  2025 urine culture negative.     In February she was initially given an antibiotic however her symptoms did not completely diminish.  The symptoms actually increased just prior to her finishing medication.  She has used AZO as needed for bladder pain.     She had a CT scan done in March.  CT without contrast showed no  abnormality.  As of note she found that her symptoms actually started after her she has since had the medication increased.  Hormone medication was decreased.  She has not noticed any other triggers.  There is nothing that she eats or drinks that causes her symptoms to be worse.  The pain is worse with a full bladder and even when urinating.  No significant urinary incontinence.  She

## 2025-07-01 NOTE — TELEPHONE ENCOUNTER
Letter faxed back to [facility] confirming for patient to take both prescriptions.  Confirmation received.

## 2025-07-02 ENCOUNTER — HOSPITAL ENCOUNTER (OUTPATIENT)
Dept: PHYSICAL THERAPY | Age: 55
Setting detail: RECURRING SERIES
Discharge: HOME | End: 2025-07-05
Attending: OBSTETRICS & GYNECOLOGY
Payer: COMMERCIAL

## 2025-07-02 LAB
BACTERIA SPEC CULT: NORMAL
SERVICE CMNT-IMP: NORMAL

## 2025-07-02 PROCEDURE — 97140 MANUAL THERAPY 1/> REGIONS: CPT | Performed by: PHYSICAL THERAPIST

## 2025-07-02 PROCEDURE — 97530 THERAPEUTIC ACTIVITIES: CPT | Performed by: PHYSICAL THERAPIST

## 2025-07-02 NOTE — PROGRESS NOTES
Jyotsna Lawler  : 1970  Primary: [employer] ([facility]AMA[facility])  Secondary:  [facility] @ [address]  [place] [address]  Phone: [phone]  Fax: [phone] Plan Frequency: 1x/week  Plan of Care/Certification Expiration Date: 25        Plan of Care/Certification Expiration Date:  Plan of Care/Certification Expiration Date: 25    Frequency/Duration: Plan Frequency: 1x/week      Time In/Out:   Time In: 0201  Time Out: 0256      PT Visit Info:         Visit Count:  6    OUTPATIENT PHYSICAL THERAPY:   Treatment Note 2025       Episode  (PFPT)               Treatment Diagnosis:     Pelvic pain  Urinary urgency    Medical/Referring Diagnosis:    Urgency of urination    Referring Physician:  Fred Delong MD MD Orders:  PT Eval and Treat   Return MD Appt:     Date of Onset 2025  Allergies:   Oxycodone-acetaminophen  Restrictions/Precautions:   None      Interventions Planned (Treatment may consist of any combination of the following):     See Assessment Note    Subjective Comments:   25:   Pt saw Urology. Considering IC diagnosis. Pt had cystoscopy with hydrodistension. No glomerulations present. Pain and pressure noted following procedure.  She continues to take Vesicare. She is going to stop taking Levsin (muscle relaxer). Urology recommended taking Oxybutynin immediate relief and Azo as needed.     Currently - she has good days and bad days. She continues to experience pain. She has two pain locations.   Supra-pubic - aching and pressure. Denies cramping. Pain does not change with voiding. Pain is worse in the afternoon/evening. Pain 3/10.   Vulvar/vaginal - burning, poking. Increases with prolonged sitting. Pain eases when standing.         *Burning has been less overall in last 2 weeks.          Sx duration - 1-2 minutes.     Denies pain with BM's.     25:  Denies bladder pain.   Denies dysuria.  Denies urinary urgency. Primarily

## 2025-07-03 RX ORDER — NORGESTREL-ETHINYL ESTRADIOL 0.3-0.03MG
TABLET ORAL
Qty: 4 PACKET | Refills: 3 | Status: SHIPPED | OUTPATIENT
Start: 2025-07-03

## 2025-07-03 RX ORDER — NORGESTREL-ETHINYL ESTRADIOL 0.3-0.03MG
TABLET ORAL
Qty: 1 PACKET | Refills: 0 | Status: SHIPPED | OUTPATIENT
Start: 2025-07-03

## 2025-07-07 ENCOUNTER — RESULTS FOLLOW-UP (OUTPATIENT)
Dept: UROLOGY | Age: 55
End: 2025-07-07

## 2025-07-07 RX ORDER — OXYBUTYNIN CHLORIDE 5 MG/1
5 TABLET ORAL 3 TIMES DAILY PRN
Qty: 90 TABLET | Refills: 1 | Status: SHIPPED | OUTPATIENT
Start: 2025-07-07

## 2025-07-09 ENCOUNTER — HOSPITAL ENCOUNTER (OUTPATIENT)
Dept: PHYSICAL THERAPY | Age: 55
Setting detail: RECURRING SERIES
Discharge: HOME | End: 2025-07-12
Attending: OBSTETRICS & GYNECOLOGY
Payer: COMMERCIAL

## 2025-07-09 PROCEDURE — 97140 MANUAL THERAPY 1/> REGIONS: CPT | Performed by: PHYSICAL THERAPIST

## 2025-07-09 PROCEDURE — 97110 THERAPEUTIC EXERCISES: CPT | Performed by: PHYSICAL THERAPIST

## 2025-07-09 ASSESSMENT — PAIN SCALES - GENERAL: PAINLEVEL_OUTOF10: 3

## 2025-07-09 NOTE — PROGRESS NOTES
Jyotsna Lawler  : 1970  Primary: [employer] ([facility] JAMA[facility])  Secondary:  [facility] @ [address]  [facility] [address]  Phone: [phone]  Fax: [phone] Plan Frequency: 1x/week  Plan of Care/Certification Expiration Date: 25        Plan of Care/Certification Expiration Date:  Plan of Care/Certification Expiration Date: 25    Frequency/Duration: Plan Frequency: 1x/week      Time In/Out:   Time In: 0900  Time Out: 1001      PT Visit Info:         Visit Count:  7    OUTPATIENT PHYSICAL THERAPY:   Treatment Note 2025       Episode  (PFPT)               Treatment Diagnosis:     Pelvic pain  Urinary urgency    Medical/Referring Diagnosis:    Urgency of urination    Referring Physician:  Fred Delong MD MD Orders:  PT Eval and Treat   Return MD Appt:     Date of Onset 2025  Allergies:   Oxycodone-acetaminophen  Restrictions/Precautions:   None      Interventions Planned (Treatment may consist of any combination of the following):     See Assessment Note    Subjective Comments:   25:  \"Everything has been steadily better\"  Mild vulvar burning after previous session (3/10).   Currently experiencing urinary urgency and \"poking sensation\" on the right. Eases in standing. Increases in sitting.   Vaginal dilators:   *Pt attempted size 3 in large dilator set. This was too painful for her. No pain/discomfort with size 2 vaginal dilator. She was able to use size 2 for 10 minutes.   No burning following.   *Has not tried sexual intercourse since Mid March due to fear of increasing pain.     25:   Pt saw Urology. Considering IC diagnosis. Pt had cystoscopy with hydrodistension. No glomerulations present. Pain and pressure noted following procedure.  She continues to take Vesicare. She is going to stop taking Levsin (muscle relaxer). Urology recommended taking Oxybutynin immediate relief and Azo as needed.     Currently - she has good days and

## 2025-07-16 ENCOUNTER — APPOINTMENT (OUTPATIENT)
Dept: PHYSICAL THERAPY | Age: 55
End: 2025-07-16
Attending: OBSTETRICS & GYNECOLOGY
Payer: COMMERCIAL

## 2025-07-23 ENCOUNTER — APPOINTMENT (OUTPATIENT)
Dept: PHYSICAL THERAPY | Age: 55
End: 2025-07-23
Attending: OBSTETRICS & GYNECOLOGY
Payer: COMMERCIAL

## 2025-07-29 ENCOUNTER — HOSPITAL ENCOUNTER (OUTPATIENT)
Dept: PHYSICAL THERAPY | Age: 55
Setting detail: RECURRING SERIES
Discharge: HOME | End: 2025-08-01
Attending: OBSTETRICS & GYNECOLOGY
Payer: COMMERCIAL

## 2025-07-29 PROCEDURE — 97140 MANUAL THERAPY 1/> REGIONS: CPT | Performed by: PHYSICAL THERAPIST

## 2025-07-29 PROCEDURE — 97110 THERAPEUTIC EXERCISES: CPT | Performed by: PHYSICAL THERAPIST

## 2025-07-29 NOTE — PROGRESS NOTES
Jyotsna Lawler  : 1970  Primary: [employer] ([employer] [employer])  Secondary:  [facility] @ [address]  [facility] [address]  Phone: [phone]  Fax: [phone] Plan Frequency: 1x/week  Plan of Care/Certification Expiration Date: 25        Plan of Care/Certification Expiration Date:  Plan of Care/Certification Expiration Date: 25    Frequency/Duration: Plan Frequency: 1x/week      Time In/Out:   Time In: 0100  Time Out: 0155      PT Visit Info:         Visit Count:  8    OUTPATIENT PHYSICAL THERAPY:   Treatment Note 2025       Episode  (PFPT)               Treatment Diagnosis:     Pelvic pain  Urinary urgency    Medical/Referring Diagnosis:    Urgency of urination    Referring Physician:  Fred Delong MD MD Orders:  PT Eval and Treat   Return MD Appt:     Date of Onset 2025  Allergies:   Oxycodone-acetaminophen  Restrictions/Precautions:   None      Interventions Planned (Treatment may consist of any combination of the following):     See Assessment Note    Subjective Comments:   25:  Since our previous visit, pt has seen PRACHI Sullivangyn ([place]). She has started bladder installations. She had fist of six installationd yesterday. She had difficulty voiding following. States she has had difficulty initiating a urine stream over the last four months.   She used the vaginal dilator most days leading up to the install. Pt used size 2 and 3 x 5 minutes each. Pain was less with dilator #3.   Urinary urgency improved overall.   Urethra discomfort is of low level pain. She is unable to describe the discomfort. Not burning.   *Continues to feel discomfort in sitting. She is using a donut pillow. \"It is better than it was\"    25:  \"Everything has been steadily better\"  Mild vulvar burning after previous session (3/10).   Currently experiencing urinary urgency and \"poking sensation\" on the right. Eases in standing. Increases in sitting.   Vaginal

## 2025-08-13 ENCOUNTER — HOSPITAL ENCOUNTER (OUTPATIENT)
Dept: PHYSICAL THERAPY | Age: 55
Setting detail: RECURRING SERIES
Discharge: HOME | End: 2025-08-16
Attending: OBSTETRICS & GYNECOLOGY
Payer: COMMERCIAL

## 2025-08-13 PROCEDURE — 97110 THERAPEUTIC EXERCISES: CPT | Performed by: PHYSICAL THERAPIST

## 2025-08-13 PROCEDURE — 97530 THERAPEUTIC ACTIVITIES: CPT | Performed by: PHYSICAL THERAPIST

## 2025-08-13 PROCEDURE — 97140 MANUAL THERAPY 1/> REGIONS: CPT | Performed by: PHYSICAL THERAPIST

## 2025-08-20 ENCOUNTER — HOSPITAL ENCOUNTER (OUTPATIENT)
Dept: PHYSICAL THERAPY | Age: 55
Setting detail: RECURRING SERIES
Discharge: HOME | End: 2025-08-23
Attending: OBSTETRICS & GYNECOLOGY
Payer: COMMERCIAL

## 2025-08-20 PROCEDURE — 97140 MANUAL THERAPY 1/> REGIONS: CPT | Performed by: PHYSICAL THERAPIST

## 2025-08-20 PROCEDURE — 97110 THERAPEUTIC EXERCISES: CPT | Performed by: PHYSICAL THERAPIST

## 2025-08-20 PROCEDURE — 97530 THERAPEUTIC ACTIVITIES: CPT | Performed by: PHYSICAL THERAPIST

## 2025-08-20 ASSESSMENT — PAIN SCALES - GENERAL: PAINLEVEL_OUTOF10: 1

## 2025-08-27 ENCOUNTER — HOSPITAL ENCOUNTER (OUTPATIENT)
Dept: PHYSICAL THERAPY | Age: 55
Setting detail: RECURRING SERIES
Discharge: HOME | End: 2025-08-30
Attending: OBSTETRICS & GYNECOLOGY
Payer: COMMERCIAL

## 2025-08-27 PROCEDURE — 97110 THERAPEUTIC EXERCISES: CPT | Performed by: PHYSICAL THERAPIST

## 2025-08-27 PROCEDURE — 97140 MANUAL THERAPY 1/> REGIONS: CPT | Performed by: PHYSICAL THERAPIST

## (undated) DEVICE — GLOVE SURG SZ 8 CRM LTX FREE POLYISOPRENE POLYMER BEAD ANTI

## (undated) DEVICE — Device

## (undated) DEVICE — SOLUTION IRRIG 1000ML H2O PIC PLAS SHATTERPROOF CONTAINER

## (undated) DEVICE — SOLUTION IRRIG 3000ML H2O STRL BAG

## (undated) DEVICE — BAG DRAIN UROLOGY GENESIS NS

## (undated) DEVICE — GOWN,REINFORCED,POLY,AURORA,XXLARGE,STR: Brand: MEDLINE